# Patient Record
Sex: FEMALE | Race: WHITE
[De-identification: names, ages, dates, MRNs, and addresses within clinical notes are randomized per-mention and may not be internally consistent; named-entity substitution may affect disease eponyms.]

---

## 2021-04-22 ENCOUNTER — HOSPITAL ENCOUNTER (OUTPATIENT)
Dept: HOSPITAL 61 - LAB | Age: 56
End: 2021-04-22
Attending: FAMILY MEDICINE
Payer: SELF-PAY

## 2021-04-22 DIAGNOSIS — E06.3: Primary | ICD-10-CM

## 2021-04-22 PROCEDURE — 36415 COLL VENOUS BLD VENIPUNCTURE: CPT

## 2021-04-22 PROCEDURE — 84443 ASSAY THYROID STIM HORMONE: CPT

## 2021-05-24 ENCOUNTER — HOSPITAL ENCOUNTER (OUTPATIENT)
Dept: HOSPITAL 61 - LAB | Age: 56
End: 2021-05-24
Attending: FAMILY MEDICINE
Payer: COMMERCIAL

## 2021-05-24 DIAGNOSIS — E03.9: Primary | ICD-10-CM

## 2021-05-24 DIAGNOSIS — E11.9: ICD-10-CM

## 2021-05-24 DIAGNOSIS — E55.9: ICD-10-CM

## 2021-05-24 LAB
ALBUMIN SERPL-MCNC: 3.9 G/DL (ref 3.4–5)
ALBUMIN/GLOB SERPL: 1.1 {RATIO} (ref 1–1.7)
ALP SERPL-CCNC: 102 U/L (ref 46–116)
ALT SERPL-CCNC: 34 U/L (ref 14–59)
ANION GAP SERPL CALC-SCNC: 11 MMOL/L (ref 6–14)
AST SERPL-CCNC: 22 U/L (ref 15–37)
BASOPHILS # BLD AUTO: 0.1 X10^3/UL (ref 0–0.2)
BASOPHILS NFR BLD: 1 % (ref 0–3)
BILIRUB SERPL-MCNC: 0.3 MG/DL (ref 0.2–1)
BUN SERPL-MCNC: 14 MG/DL (ref 7–20)
BUN/CREAT SERPL: 20 (ref 6–20)
CALCIUM SERPL-MCNC: 8.6 MG/DL (ref 8.5–10.1)
CHLORIDE SERPL-SCNC: 106 MMOL/L (ref 98–107)
CHOLEST SERPL-MCNC: 191 MG/DL (ref 0–200)
CHOLEST/HDLC SERPL: 3.6 {RATIO}
CO2 SERPL-SCNC: 27 MMOL/L (ref 21–32)
CREAT SERPL-MCNC: 0.7 MG/DL (ref 0.6–1)
EOSINOPHIL NFR BLD: 0.2 X10^3/UL (ref 0–0.7)
EOSINOPHIL NFR BLD: 3 % (ref 0–3)
ERYTHROCYTE [DISTWIDTH] IN BLOOD BY AUTOMATED COUNT: 14.8 % (ref 11.5–14.5)
GFR SERPLBLD BASED ON 1.73 SQ M-ARVRAT: 86.2 ML/MIN
GLUCOSE SERPL-MCNC: 97 MG/DL (ref 70–99)
HBV SURFACE AB SER RIA-ACNC: 172.2 MG/DL
HBV SURFACE AG SERPL QL IA: 9.3 UG/ML
HCT VFR BLD CALC: 42.6 % (ref 36–47)
HDLC SERPL-MCNC: 53 MG/DL (ref 40–60)
HGB BLD-MCNC: 14.2 G/DL (ref 12–15.5)
LDLC: 108 MG/DL (ref 0–100)
LYMPHOCYTES # BLD: 1.9 X10^3/UL (ref 1–4.8)
LYMPHOCYTES NFR BLD AUTO: 31 % (ref 24–48)
MCH RBC QN AUTO: 29 PG (ref 25–35)
MCHC RBC AUTO-ENTMCNC: 33 G/DL (ref 31–37)
MCV RBC AUTO: 86 FL (ref 79–100)
MICRO CREAT RATIO: 5 MG/G CREAT (ref 0–29)
MONO #: 0.4 X10^3/UL (ref 0–1.1)
MONOCYTES NFR BLD: 6 % (ref 0–9)
NEUT #: 3.6 X10^3/UL (ref 1.8–7.7)
NEUTROPHILS NFR BLD AUTO: 59 % (ref 31–73)
PLATELET # BLD AUTO: 265 X10^3/UL (ref 140–400)
POTASSIUM SERPL-SCNC: 4.2 MMOL/L (ref 3.5–5.1)
PROT SERPL-MCNC: 7.6 G/DL (ref 6.4–8.2)
RBC # BLD AUTO: 4.94 X10^6/UL (ref 3.5–5.4)
SODIUM SERPL-SCNC: 144 MMOL/L (ref 136–145)
T4 FREE SERPL-MCNC: 1.36 NG/DL (ref 0.76–1.46)
THYROID STIM HORMONE (TSH): 1.73 UIU/ML (ref 0.36–3.74)
TRIGL SERPL-MCNC: 151 MG/DL (ref 0–150)
VLDLC: 30 MG/DL (ref 0–40)
WBC # BLD AUTO: 6.2 X10^3/UL (ref 4–11)

## 2021-05-24 PROCEDURE — 82306 VITAMIN D 25 HYDROXY: CPT

## 2021-05-24 PROCEDURE — 80053 COMPREHEN METABOLIC PANEL: CPT

## 2021-05-24 PROCEDURE — 84439 ASSAY OF FREE THYROXINE: CPT

## 2021-05-24 PROCEDURE — 83036 HEMOGLOBIN GLYCOSYLATED A1C: CPT

## 2021-05-24 PROCEDURE — 82043 UR ALBUMIN QUANTITATIVE: CPT

## 2021-05-24 PROCEDURE — 85025 COMPLETE CBC W/AUTO DIFF WBC: CPT

## 2021-05-24 PROCEDURE — 82570 ASSAY OF URINE CREATININE: CPT

## 2021-05-24 PROCEDURE — 80061 LIPID PANEL: CPT

## 2021-05-24 PROCEDURE — 36415 COLL VENOUS BLD VENIPUNCTURE: CPT

## 2021-05-24 PROCEDURE — 84443 ASSAY THYROID STIM HORMONE: CPT

## 2021-05-25 LAB — HBA1C MFR BLD: 5.9 % (ref 4.8–5.6)

## 2021-05-27 ENCOUNTER — HOSPITAL ENCOUNTER (OUTPATIENT)
Dept: HOSPITAL 61 - MAMMO | Age: 56
End: 2021-05-27
Attending: FAMILY MEDICINE
Payer: COMMERCIAL

## 2021-05-27 DIAGNOSIS — M51.36: ICD-10-CM

## 2021-05-27 DIAGNOSIS — N64.89: ICD-10-CM

## 2021-05-27 DIAGNOSIS — Z12.31: Primary | ICD-10-CM

## 2021-05-27 PROCEDURE — 77063 BREAST TOMOSYNTHESIS BI: CPT

## 2021-05-27 PROCEDURE — 72148 MRI LUMBAR SPINE W/O DYE: CPT

## 2021-05-27 PROCEDURE — 77067 SCR MAMMO BI INCL CAD: CPT

## 2021-05-27 NOTE — RAD
EXAM: Bilateral digital screening mammogram with tomosynthesis.



HISTORY: 55-year-old female presents for screening mammography.



TECHNIQUE: Full-field digital craniocaudal and mediolateral oblique 2D and 3D tomosynthesis images of
 both breasts are obtained for evaluation. Computer aided detection was applied.



COMPARISON: 3/16/2020



BREAST PARENCHYMAL DENSITY: Level B - Scattered fibroglandular densities.



FINDINGS: There are 2 adjacent sites of possible architectural distortion within the left breast at m
id depth at the 9:00 and 10:00 positions centered approximately 9.5 cm and 7.5 cm from the nipple. Th
philomena are best seen on series 4, images 34 and 44 and series 10, image 51.



There are multiple areas of nodularity and asymmetry within both breasts which are not significantly 
changed when allowing for differences in imaging technique. There are a few benign calcifications.



IMPRESSION: BI-RADS Category 0: Incomplete. Additional imaging needed.



RECOMMENDATION: Further evaluation with a 3D tomosynthesis true lateral view of the left breast and s
pot compression craniocaudal and mediolateral oblique views of the left breast to assess suspected ar
chitectural distortion at the 9:00 and 10:00 positions recommended. Sonographic imaging can also be p
erformed if deemed indicated based on additional mammographic findings.



If your mammogram demonstrates that you have dense breast tissue, which could hide abnormalities, and
 if you have other risk factors for breast cancer that have been identified, you might benefit from s
upplemental screening tests that may be suggested by your ordering physician.  Dense breast tissue, i
n and of itself, is a relatively common condition.  This information is not provided to cause undue c
oncern, but rather to raise your awareness and to promote discussion with your physician regarding th
e presence of other risk factors, in addition to dense breast tissue. A report of your mammography re
sults will be sent to you and your physician.  You should contact your physician if you have any ques
tions or concerns regarding this report.  



Mammography is a sensitive method for finding small breast cancers, but it does not detect them all a
nd is not a substitute for careful clinical examination.  A negative mammogram does not negate a clin
ically suspicious finding and should not result in delay in biopsying a clinically suspicious abnorma
lity.



PQRS compliance statement -  Patient information was entered into a reminder system with a target due
 date for the next mammogram. 



"Our facility is accredited by the [employer] Mammography Program."



Electronically signed by: Jaylyn Rain MD (5/27/2021 10:59 AM) [id number]

## 2021-05-27 NOTE — RAD
MRI of the lumbar spine without contrast 5/27/2021



CLINICAL HISTORY: Low back pain which radiates down the left leg.



TECHNIQUE: Unenhanced T1-weighted and T2-weighted sagittal and axial and inversion recovery sagittal 
images of the lumbar spine were obtained.



FINDINGS: Minimal S-shaped curvature of the thoracolumbar spine is seen. Degenerative signal changes 
are seen involving all of the disks of the lumbar spine. Degenerative signal changes are seen within 
the marrow surrounding these discs. The conus medullaris is normal morphology, position, and signal c
haracteristics.



On the axial images throughout the lumbar disc spaces, the changes of degenerative disc disease are s
een. These consist of minimal to mild generalized disc bulges, degenerative changes involving the fac
et joints and mild to moderate ligamentum flavum hypertrophy. These findings do not result in signifi
cant central spinal canal or neural foraminal stenosis at any level.



IMPRESSION: The changes of degenerative disc disease are seen throughout the lumbar spine. These find
ings do not result in significant central spinal canal or neural foraminal stenosis.



Electronically signed by: Carlos Davies MD (5/27/2021 3:34 PM) [id number]

## 2021-06-01 ENCOUNTER — HOSPITAL ENCOUNTER (OUTPATIENT)
Dept: HOSPITAL 61 - MAMMO | Age: 56
End: 2021-06-01
Attending: FAMILY MEDICINE
Payer: COMMERCIAL

## 2021-06-01 DIAGNOSIS — N63.20: Primary | ICD-10-CM

## 2021-06-01 PROCEDURE — 77065 DX MAMMO INCL CAD UNI: CPT

## 2021-06-01 PROCEDURE — 76641 ULTRASOUND BREAST COMPLETE: CPT

## 2021-06-01 NOTE — RAD
EXAMINATION:  US BREAST LT, MG DIGITAL UNILAT DIAGNOSTIC MAMMO WITH EARL



History: Recalled from screening mammogram for architectural distortion in the left breast.



Comparison:  Screening mammogram 5/27/2021. 



Technique: Spot compression CC and MLO views and full field MLO view of the left breast were obtained
. Ultrasound of the upper outer left breast was performed.





Findings: 

Breast Tissue Density B : There are scattered areas of fibroglandular density. The areas of 
ural distortion at 9:00 and 10:00 9.5 and 7.5 cm posterior to the nipple are not conspicuous on spot 
compression or full-field ML views.



ULTRASOUND: There is a hypoechoic antiparallel mass measuring 3 x 3 x 2 mm 11:00, 5 cm from the nippl
e. This has slightly irregular margins with suggestion of spiculation on cine clip. There is a second
 hypoechoic mass measuring 3 x 3 x 2 mm at 9:00, 6 cm from the nipple. This has slightly angular dariel
ins and is wider than tall. These 2 masses may correspond with the mammographic abnormalities althoug
h are closer to the nipple than seen on mammogram. There is a third more hypoechoic mass at 11:00 in 
the retroareolar region measuring 4 x 3 x 2 mm that was incidentally seen. This is slightly more circ
umscribed in appearance, possibly clustered microcysts. No axillary lymphadenopathy.



IMPRESSION:

3 hypoechoic masses in the left breast, 2 of which may possibly been not definitively correlate with 
the areas of distortion on mammogram. Recommend ultrasound-guided biopsy of the most suspicious mass 
at 11:00, 5 cm from the nipple.





BI-RADS Category 4:  Suspicious.



There is also recommendations were discussed with the patient who is in agreement with the plan.



Mammography is the most sensitive method for finding small breast cancers, but it does not detect the
m all and is not a substitute for careful clinical examination. A negative mammogram does not negate 
a clinically suspicious finding and should not result in delay in biopsying a clinically suspicious a
bnormality.



 "Our facility is accredited by the American [employer] Mammography Program."





The images were reviewed with computer aided detection.



Electronically signed by: Margarita Cosme MD (6/1/2021 5:58 PM) [id number]

## 2021-06-07 ENCOUNTER — HOSPITAL ENCOUNTER (OUTPATIENT)
Dept: HOSPITAL 61 - PNCL | Age: 56
Discharge: HOME | End: 2021-06-07
Attending: ANESTHESIOLOGY
Payer: COMMERCIAL

## 2021-06-07 DIAGNOSIS — M54.5: Primary | ICD-10-CM

## 2021-06-07 DIAGNOSIS — Z98.890: ICD-10-CM

## 2021-06-07 DIAGNOSIS — M51.36: ICD-10-CM

## 2021-06-07 DIAGNOSIS — Z79.899: ICD-10-CM

## 2021-06-07 DIAGNOSIS — M19.90: ICD-10-CM

## 2021-06-07 DIAGNOSIS — M25.552: ICD-10-CM

## 2021-06-07 PROCEDURE — 64493 INJ PARAVERT F JNT L/S 1 LEV: CPT

## 2021-06-07 PROCEDURE — 64494 INJ PARAVERT F JNT L/S 2 LEV: CPT

## 2021-06-07 NOTE — PDOC4
PROCEDURE


Procedure


Patient was consented for left-sided L4-5 and L5-S1 facet joint injections with 

fluoroscopic guidance.  Risks were discussed including but not limited to: 

Bleeding, infection, possibility of epidural hematoma and subsequent 

neurological compromise, dural puncture, headaches, spinal cord and/or nerve 

damage, side effects of steroid medication, and poor results regarding pain 

control.  Patient understands and wished to proceed.





Under sterile prep and drape using C-arm fluoroscopic guidance AP and lateral 

and oblique views, left L4-5 and L5-S1 facet joint injections were performed, m

edications injected: 80 mg Depo-Medrol +2 cc 0.25% bupivacaine +1 cc contrast.  

Condition at discharge stable patient tolerated  the procedure well and no 

complications.











LISANDRO SCOTT MD                Jun 7, 2021 12:02

## 2021-06-07 NOTE — PDOC1
INITIAL PAIN CONSULT


DATE OF SERVICE:


DOS:


DATE: 6/7/21 


TIME: 11:53





CHIEF COMPLAINT:


Chief Complaint:


Low back and left hip pain





HISTORY OF PRESENT ILLNESS:


55-year-old female presents with history of pain low back and into the left hip 

and posterior gluteus occasionally worse for about 3 years.  Patient reports is 

gradually increasing as not the result of any specific injury or accident that 

she is aware of is a constant and sharp at times now she has been treatment at 

outside facility about 2 years ago with radiofrequency ablation on the right 

side only which is about 95% effective and is still doing well the left side is 

her main complaint in the low back itself without radiation to the lower 

extremities but into the left posterior hip at times patient report is worse 

with walking standing changing positions sitting for prolonged periods wakes her

from sleep least 2-3 times a night does not affect her bowel bladder control or 

ability to walk but is uncomfortable.  Patient reports she has not had any 

recent therapies or chiropractic treatments as had these in the past and is 

doing some stretching daily also taking Tylenol and Aleve as well as diclofenac 

cream which does help mildly.  Patient reports the pain is constant sharp at 

times in the low back throbbing aching especially with prolonged sitting and 

standing.  Patient rates her pain is anywhere from a 7-10 with family home 

responsibilities 5-6 with recreation 5-8 with social activity 5-7 with 

occupation sexual behavior self-care and 5-8 with life support activities.  

Patient have MRI scan lumbar spine showing no significant central spinal canal 

stenosis but some changes of degenerative disc disease throughout the lumbar 

spine.  Patient reports no loss of motor function no bowel or bladder 

incontinence.





PAST MEDICAL HISTORY:


PMH:


Arthritis





PREVIOUS SURGERIES:


Past Surgical Hx:


Cervical spinal fusion, total abdominal hysterectomy, right inguinal hernia 

repair





CURRENT MEDICATIONS:


Current Meds:





Active Scripts








 Medications  Dose


 Route/Sig


 Max Daily Dose Days Date Category


 


 Aleve (Naproxen


 Sodium) 220 Mg


 Capsule  220 Mg


 PO BID


   6/7/21 Reported


 


 Acetaminophen 500


 Mg Tablet  1 Tab


 PO PRN Q6HRS PRN


  15 6/7/21 Reported


 


 Buspirone Hcl 5


 Mg Tablet  Unknown Dose


 PO DAILY


   6/7/21 Reported


 


 Oxybutynin


 Chloride 5 Mg


 Tablet  1 Tab


 PO DAILY


   6/7/21 Reported


 


 Levothyroxine


 Sodium 100 Mcg


 Tablet  1 Tab


 PO DAILY


   6/7/21 Reported











FAMILY HISTORY:


Family Hx:


No major medical problems or conditions that she is aware of.





SOCIAL HISTORY:


Social Hx:


Patient is nondrug alcohol does not smoke or use any illegal illicit or 

recreational drugs patient is single lives locally in [facility] and works 

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





REVIEW OF SYSTEMS:


ROS:


Positive for those items mentioned in history of present illness, all systems 

are reviewed, otherwise negative ,and are complete full and well-documented on 

patient's chart.





PHYSICAL EXAM:


VS:


Blood pressure is 133/99 pulse 1 1 respirations 16 temperature 98.3 F height is

5 foot 9 inches weight is 231 pounds


PE:


PHYSICAL EXAMINATION:





GENERAL: The patient is awake, alert, oriented, appropriate, very pleasant in 

demeanor.


HEENT: Shows normocephalic, atraumatic.  Extraocular movements are intact and 

symmetrical.  Oral cavity: Mucous membranes moist and pink.  Dentition intact.


NECK: Shows anterior throat supple without palpable lymphadenopathy noted.  

Swallow reflex symmetrical.


CHEST: Shows normal on inspection.  Breath sounds are clear bilaterally, no 

rales rhonchi wheezes auscultated.


HEART: Shows S1, S2 clear.  No murmurs auscultated.


ABDOMEN: Soft, nontender, nondistended, obese.  No palpable organomegaly is 

noted.  No rebound or guarding demonstrated.


BACK: Shows spine grossly in the midline.  Normal-appearing cervical lordotic 

curvature.  There is increased thoracic kyphosis, some minor flattening of the 

lumbar lordotic curvature.  Lumbar paraspinous muscles show symmetrical on insp

ection, on palpation shows some moderate tenderness diffusely throughout the 

upper, middle and lower distribution of the paraspinous muscles bilaterally and 

also into the lower thoracic paraspinous musculature, firm, but without specific

trigger points, without radiation of pain.  The patient has good rotational 

motion of the lumbar spine, both laterally as well as extension and flexion with

significant tenderness with rotation to the left greater than 10 degrees as well

as with extension of the lumbar spine and axial loading of the low back 

significant pain on the left side with some radiation to the posterior gluteus. 

No tenderness over the spinous processes, sacrum or sacroiliac regions.


EXTREMITIES: Lower extremities show deep tendon reflexes 2+ in the patellar and 

tendo calcaneus tendons.  Motor exam is 5 on a scale of 5 with right 

dorsiflexion, extension, quadriceps and hamstring flexion and 5/5 on the left.  

Peripheral pulses are 1+ posterior tibial.  No peripheral edema is noted 

bilaterally.  Lower extremities are warm and dry to touch, equal in color and 

appearance.  Straight leg raise noted to be negative bilaterally.


SKIN: Shows warm and dry, good turgor.  No edema.  No sores, rashes or bruising 

throughout.





IMPRESSION:


Impression:


55-year-old female with approximate 3-year history of pain left-sided low back, 

consistent with facetogenic pain.


History of radiofrequency ablation on the right with very good results.


MRI scan as noted


Arthritis





Plan: Options discussed with the patient including conservative medical 

management continued physical therapies and interventional techniques.  As 

patient did very well with previous interventional techniques would like to 

pursue these again.  We discussed a lumbar facet joint injection today using 

descriptions as well as anatomical models to describe the procedure.  Patient 

understands and would like to proceed.  As she did very well with radiofrequency

ablation on the right side we will preauthorize for radiofrequency on the left 

side and have diagnostic blocks done today on the left.  Patient will return to 

the clinic in approximately 1 week we will plan on radiofrequency ablation on 

the left side L4-5 and L5-S1 levels at that time.





Under sterile prep and drape using C-arm fluoroscopic guidance AP and lateral 

and oblique views, left L4-5 and L5-S1 facet joint injections were performed, 

medications injected: 80 mg Depo-Medrol +2 cc 0.25% bupivacaine +1 cc contrast. 

Condition at discharge stable patient tolerated  the procedure well and no 

complications.











LISANDRO SCOTT MD                Jun 7, 2021 12:02

## 2021-06-09 ENCOUNTER — HOSPITAL ENCOUNTER (OUTPATIENT)
Dept: HOSPITAL 61 - US | Age: 56
Discharge: HOME | End: 2021-06-09
Attending: FAMILY MEDICINE
Payer: COMMERCIAL

## 2021-06-09 DIAGNOSIS — R92.8: ICD-10-CM

## 2021-06-09 DIAGNOSIS — Z79.899: ICD-10-CM

## 2021-06-09 DIAGNOSIS — N63.24: Primary | ICD-10-CM

## 2021-06-09 PROCEDURE — 88305 TISSUE EXAM BY PATHOLOGIST: CPT

## 2021-06-09 PROCEDURE — 77065 DX MAMMO INCL CAD UNI: CPT

## 2021-06-09 PROCEDURE — 88342 IMHCHEM/IMCYTCHM 1ST ANTB: CPT

## 2021-06-09 PROCEDURE — 88341 IMHCHEM/IMCYTCHM EA ADD ANTB: CPT

## 2021-06-09 PROCEDURE — 19083 BX BREAST 1ST LESION US IMAG: CPT

## 2021-06-09 NOTE — RAD
EXAM: 

1. ULTRASOUND-GUIDED CORE BIOPSY LEFT BREAST WITH CLIP PLACEMENT.

2. POSTCLIP DIAGNOSTIC LEFT MAMMOGRAPHY.



HISTORY: Left breast mass. Ultrasound-guided biopsy is requested.



FINDINGS:  The procedure along with its risks and benefits were explained to the patient. She agreed 
to proceed. A timeout procedure was performed.



The patient's prior mammography and sonography were reviewed. Sonographic images of the left 9:00 pos
ition were also performed. These reveal an irregular hypoechoic solid mass at the 9:00 position 10 cm
 from the nipple measuring 7 x 6 x 6 mm. This is antiparallel and indeterminate. Prior imaging of the
 left axilla reveals no suspicious lymph nodes.



The regions of suggested architectural distortion on prior mammography do not clearly persist and do 
not have clear ultrasound correlates. The lesion of prior sonographic concern appear to represent sma
ll complicated cysts at the 11:00 position 5 cm from the nipple, and subareolar position.



It was decided to biopsy the lesion at the 9:00 position 10 cm from the nipple. The overlying skin wa
s sterilely prepped and infiltrated with 1% lidocaine for local anesthesia. Under ultrasound guidance
, 3 core needle specimens of the target lesion were obtained using a 14-gauge biopsy device. These we
re submitted in formalin. A postbiopsy clip was placed under ultrasound guidance. Pressure was held t
o hemostasis. There were no immediate complications.



Full-field digital mammographic views of the left breast were obtained in CC and MLO projections and 
interpreted on a dedicated workstation. They demonstrate the clip in correspondence with the target l
esion.



IMPRESSION:

1. Successful ultrasound-guided left breast biopsy with clip placement of a mass at the left 9:00 pos
ition 10 cm from the nipple.

2. The postbiopsy clip corresponds with the target lesion.

3. The suggested sonographic lesion at the 11:00 position 5 cm from the nipple most likely reflects a
 benign complicated cyst. The suggested regions of architectural distortion on prior mammographic scr
eening have no clear sonographic correlate. These findings were discussed with the patient. If the co
mpleted biopsy is positive, breast MRI with and without contrast could screen for additional sites of
 disease and further exclude significant lesions at regions of sonographic and mammographic concern. 
If the biopsy is negative, six-month follow-up left diagnostic mammography and sonography is recommen
ded.



Electronically signed by: JANAE Long MD (6/9/2021 2:35 PM) [id number]

## 2021-06-09 NOTE — RAD
EXAM: 

1. ULTRASOUND-GUIDED CORE BIOPSY LEFT BREAST WITH CLIP PLACEMENT.

2. POSTCLIP DIAGNOSTIC LEFT MAMMOGRAPHY.



HISTORY: Left breast mass. Ultrasound-guided biopsy is requested.



FINDINGS:  The procedure along with its risks and benefits were explained to the patient. She agreed 
to proceed. A timeout procedure was performed.



The patient's prior mammography and sonography were reviewed. Sonographic images of the left 9:00 pos
ition were also performed. These reveal an irregular hypoechoic solid mass at the 9:00 position 10 cm
 from the nipple measuring 7 x 6 x 6 mm. This is antiparallel and indeterminate. Prior imaging of the
 left axilla reveals no suspicious lymph nodes.



The regions of suggested architectural distortion on prior mammography do not clearly persist and do 
not have clear ultrasound correlates. The lesion of prior sonographic concern appear to represent sma
ll complicated cysts at the 11:00 position 5 cm from the nipple, and subareolar position.



It was decided to biopsy the lesion at the 9:00 position 10 cm from the nipple. The overlying skin wa
s sterilely prepped and infiltrated with 1% lidocaine for local anesthesia. Under ultrasound guidance
, 3 core needle specimens of the target lesion were obtained using a 14-gauge biopsy device. These we
re submitted in formalin. A postbiopsy clip was placed under ultrasound guidance. Pressure was held t
o hemostasis. There were no immediate complications.



Full-field digital mammographic views of the left breast were obtained in CC and MLO projections and 
interpreted on a dedicated workstation. They demonstrate the clip in correspondence with the target l
esion.



IMPRESSION:

1. Successful ultrasound-guided left breast biopsy with clip placement of a mass at the left 9:00 pos
ition 10 cm from the nipple.

2. The postbiopsy clip corresponds with the target lesion.

3. The suggested sonographic lesion at the 11:00 position 5 cm from the nipple most likely reflects a
 benign complicated cyst. The suggested regions of architectural distortion on prior mammographic scr
eening have no clear sonographic correlate. These findings were discussed with the patient. If the co
mpleted biopsy is positive, breast MRI with and without contrast could screen for additional sites of
 disease and further exclude significant lesions at regions of sonographic and mammographic concern. 
If the biopsy is negative, six-month follow-up left diagnostic mammography and sonography is recommen
ded.



Electronically signed by: JANAE Long MD (6/9/2021 2:35 PM) [id number]

## 2021-06-15 NOTE — PATHOLOGY
[facility] Accession Number: [id number]

.                                                                01

Material submitted:                                        .

breast - LEFT BREAST TISSUE 900 10 CM FN. Modifiers: left, 900, 10 CM

.                                                                01

Clinical history:                                          .

LEFT BREAST BIOPSY

.                                                                02

**********************************************************************

Diagnosis:

Breast tissue, left breast mass 9:00, 10 cm from nipple needle biopsies:

- INVASIVE DUCTAL CARCINOMA, HISTOLOGIC GRADE 2.  SEE COMMENT.

(JPM:/[email or web address]; 06/14/2021)

[facility]  06/15/2021  0816 Local

**********************************************************************

.                                                                02

Comment:

Sections of the left breast mass 9:00 needle biopsy reveal an invasive

mammary carcinoma.  The tumor cells have a small solid nested and focal

cord-like arrangement and show no evidence of tubule formation.  The

tumor is associated with a reactive stroma and focally infiltrates fatty

tissue.  The tumor cells have mild to moderate amounts of pale

eosinophilic cytoplasm, and possess enlarged, mild to focally moderately

pleomorphic nuclei.  A few mitotic figures are noted.  There are no

tumor-associated calcifications. There is no lymphovascular tumor

invasion.  Invasive carcinoma measures up to 6 mm in greatest dimension

on the glass slide.  A limited panel of immunoperoxidase stains is

obtained on A1 and yields the following results:

.

AE1/AE3:  Tumor cells positive

E-cadherin:  Tumor cells positive

.

The morphologic and immunophenotypic findings are supportive of the

diagnosis of an invasive ductal carcinoma, histologic grade 2, with focal

lobular features.  The case is also examined by Dr. Jha, who concurs

with the diagnosis.  Breast prognostic studies will be obtained on A1, the

results of which will be reported separately.

(J[facility]:/[email or web address]; 06/14/2021)

.                                                                02

Electronically signed:                                     .

Mikey Romano MD, Pathologist

NPI- [id number]

.                                                                01

Gross description:                                         .

The specimen is received in formalin, labeled "Jessica Dixon, left breast

9:00 10 cm from nipple" received as 2 soft gray-yellow tissue cores

measuring up to 1.4 cm x 0.2 cm.  The specimen is entirely submitted A1.

The specimen is removed from the patient at 1333 hours and placed in

formalin at 1334 hours on Wednesday, June 9, 2021. The specimen is removed

from formalin at 11:30pm. The specimen is in formalin for greater than 6

hours and less than 72 hours.

([facility]; 6/9/2021)

ELY/ELY  06/10/2021  0755 Local

.                                                                02

Pathologist provided ICD-10:

C50.912

.                                                                02

CPT                                                        .

692023, C41925, T13753

Specimen Comment: A courtesy copy of this report has been sent to [phone], [phone]

Specimen Comment: [id number]

Specimen Comment: Report sent to  / DR POLANCO

***Performed at:  01

   [facility]

   [address], [place]  [id number]

   MD Rob Ventura MD Phone:  [phone]

***Performed at:  02

   [facility]

   [address], [place]  [id number]

   MD Mikey Romano MD Phone:  [phone]

## 2021-06-22 ENCOUNTER — HOSPITAL ENCOUNTER (OUTPATIENT)
Dept: HOSPITAL 61 - PNCL | Age: 56
Discharge: HOME | End: 2021-06-22
Attending: ANESTHESIOLOGY
Payer: COMMERCIAL

## 2021-06-22 DIAGNOSIS — M47.817: ICD-10-CM

## 2021-06-22 DIAGNOSIS — Z79.899: ICD-10-CM

## 2021-06-22 DIAGNOSIS — M51.36: Primary | ICD-10-CM

## 2021-06-22 PROCEDURE — 64636 DESTROY L/S FACET JNT ADDL: CPT

## 2021-06-22 PROCEDURE — 64635 DESTROY LUMB/SAC FACET JNT: CPT

## 2021-06-22 NOTE — PDOC4
PROCEDURE


Procedure


Patient was consented for left L4-5 and L5-S1 medial branch radiofrequency a

blation with fluoroscopic guidance.  Risks were discussed including but not 

limited to: Bleeding, infection, possibility of epidural hematoma and subsequent

neurological compromise, dural puncture, headaches, spinal cord and/or nerve 

damage, potential thermal damage to the surrounding structures including motor 

nerves with permanent ischemic damage, side effects of steroid medication, and 

poor results regarding pain control.  Patient understands and wished to proceed.


Under sterile prep and drape patient in prone position using C-arm fluoroscopic 

guidance patient's lumbar spine was visualized in both AP oblique and lateral 

views using 1% lidocaine to topically anesthetize the areas overlying the left 

L3-4, L4-5 and L5-S1 facet joints at the point of the medial branches.  Using a 

22-gauge insulated radiofrequency needle with curved tips and stylette, the 

needles were advanced to contact the region of the facet with the medial branch 

targets.  This was repeated at the L3-4 L4-5 and L5-S1 levels.  Stylette was r

emoved and using radiofrequency probe inserted into each needle individually at 

each level and then motor tested with no motor stimulation of the lower 

extremity.  Patient did have some multifidus musculature contraction in the 

lumbar spine only but without radiation.  At this time 1 cc of 2% lidocaine was 

then injected in each needle after motor testing but prior to radiofrequency 

ablation.  Needle position was confirmed continuously throughout the 

radiofrequency ablation with both AP oblique and lateral views at each level.  

At this time radiofrequency ablation was carried out each level for 60 seconds 

at 80 C x 2 at each level with the tip of the needle turned 90 degrees after 

the first 60 seconds and then subsequent 60 seconds of radiofrequency ablation. 

Once radiofrequency ablation was completed solution containing 0.25% bupivacaine

1 cc and 20 mg Depo-Medrol was injected at each level.  Needle was then 

withdrawn. Patient had no paresthesias throughout the procedure no radiation of 

pain into the lower extremities no lower extremity motor response with motor 

testing bilaterally.  Please see radiofrequency flowsheet for levels, t

emperatures, impedance, etc.











LISANDRO SCOTT MD               Jun 22, 2021 14:58

## 2021-06-22 NOTE — PDOC
Progress Note - Pain Clinic


Date of Service:


DOS:


DATE: 6/22/21 


TIME: 14:52





Diagnosis:


Dx:


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





History or Present Illness:


HPI:


56-year-old female returns for follow-up status post lumbar facet medial branch 

injections June 7, 2021.  Patient reports that these helped by about 75% 

initially now about 50% improvement for about 1 to 2 weeks patient reports still

significant pain in the low back on the left side only.  Patient had had 

radiofrequency ablation on the right side about 2 years ago and the left side 

now is becoming more significantly tender again better with the facet injections

of 2 weeks ago.  Patient describes the pain still in the low back on the left 

side only sharp radiating across the back at times mostly just in the low back 

not radiating to the lower extremities can be constant with standing walking and

sitting for prolonged periods.  Patient reports her pain is anywhere from 6-10 

at its worst the past week 5 on average to its least and is a 5 today.  Patient 

reports no new motor or sensory deficits no new bladder or bowel incontinence or

other complaints.





Physical Exam:


VS:


Blood pressure is 134/85 pulse 81 respirations 16 temperature 98.3 F weight is 

229 pounds


PE:


PHYSICAL EXAMINATION:





GENERAL: The patient is awake, alert, oriented, appropriate, very pleasant 

demeanor


HEENT: Shows normocephalic, atraumatic.  Extraocular movements are intact and 

symmetrical.  


NECK: Shows anterior throat supple without palpable lymphadenopathy noted.  

Swallow reflex symmetrical.


BACK: Shows spine grossly in the midline.  Normal-appearing cervical lordotic 

curvature.  There is slightly increased thoracic kyphosis, some minor flattening

of the lumbar lordotic curvature.  Lumbar paraspinous muscles show symmetrical 

on inspection, on palpation shows some moderate tenderness diffusely throughout 

the upper, middle and lower distribution of the paraspinous muscles bilaterally 

and also into the lower thoracic paraspinous musculature, firm and tender, but 

without specific trigger points, without radiation of pain.  The patient has 

good rotational motion of the lumbar spine, with moderate tenderness with 

extension right and left lateral rotation greater than 10 degrees and 

significant tenderness with extension lumbar spine only in the left side of the 

low back but this is relieved with forward flexion 45 degrees.  No tenderness 

over the spinous processes, sacrum or sacroiliac regions.


EXTREMITIES: Lower extremities show deep tendon reflexes 2+ in the patellar and 

tendo calcaneus tendons.  Motor exam is 5 on a scale of 5 with right 

dorsiflexion, extension, quadriceps and hamstring flexion and 5/5 on the left.  

Peripheral pulses are 1+ posterior tibial.  No peripheral edema is noted 

bilaterally.  Lower extremities are warm and dry to touch, equal in color and 

appearance.


SKIN: Shows warm and dry, good turgor.  No edema.  No sores, rashes or bruising 

throughout.





Procedure:


Procedure:


Options discussed with patient.  Patient chart was reviewed as her current 

medication regimen updated current review of systems updated today as well.  We 

will proceed with radiofrequency ablation of the left L4-5 and L5-S1 medial 

branch with fluoroscopic guidance.  Risks were discussed including but not 

limited to: Bleeding, infection, possibility of epidural hematoma and subsequent

neurological compromise, dural puncture, headaches, spinal cord and/or nerve 

damage, potential thermal damage to the surrounding tissues as well as motor 

nerves with permanent ischemic damage, side effects of steroid medication, and 

poor results regarding pain control.  Patient understands and wished to proceed.

 Patient will return to the clinic in approximately 3 weeks for follow-up, was 

counseled as to return appointment activity level and side effects to be aware 

of.





Medication Injected:


Med Injected:


Under sterile prep and drape patient in prone position using C-arm fluoroscopic 

guidance patient's lumbar spine was visualized in both AP oblique and lateral 

views using 1% lidocaine to topically anesthetize the areas overlying the L3-4, 

L4-5 and L5-S1 facet joints at the point of the medial branches.  Using a 22-

gauge insulated radiofrequency needle with curved tips and stylette, the needles

were advanced to contact the region of the facet with the medial branch targets.

 This was repeated at the L3-4 L4-5 and L5-S1 levels.  Stylette was removed and 

using radiofrequency probe inserted into each needle individually at each level 

and then motor tested with no motor stimulation of the lower extremity.  Patient

did have some multifidus musculature contraction in the lumbar spine only but 

without radiation.  At this time 1 cc of 2% lidocaine was then injected in each 

needle after motor testing but prior to radiofrequency ablation.  Needle 

position was confirmed continuously throughout the radiofrequency ablation with 

both AP oblique and lateral views at each level.  At this time radiofrequency 

ablation was carried out each level for 60 seconds at 80 C x 2 at each level 

with the tip of the needle turned 90 degrees after the first 60 seconds and then

subsequent 60 seconds of radiofrequency ablation.  Once radiofrequency ablation 

was completed solution containing 0.25% bupivacaine 1 cc and 20 mg Depo-Medrol 

was injected at each level.  Needle was then withdrawn. Patient had no 

paresthesias throughout the procedure no radiation of pain into the lower 

extremities no lower extremity motor response with motor testing bilaterally.  

Please see radiofrequency flowsheet for levels, temperatures, impedance, etc.





Condition at Discharge:


Condition at Discharge:


Condition at discharge stable, patient alert the procedure well and had no 

complications.











LISANDRO SCOTT MD               Jun 22, 2021 14:57

## 2021-06-23 ENCOUNTER — HOSPITAL ENCOUNTER (OUTPATIENT)
Dept: HOSPITAL 35 - MRI | Age: 56
End: 2021-06-23
Payer: COMMERCIAL

## 2021-06-23 DIAGNOSIS — C50.912: ICD-10-CM

## 2021-06-23 DIAGNOSIS — Z53.9: Primary | ICD-10-CM

## 2021-07-12 ENCOUNTER — HOSPITAL ENCOUNTER (OUTPATIENT)
Dept: HOSPITAL 61 - SURG | Age: 56
Discharge: HOME | End: 2021-07-12
Attending: SURGERY
Payer: COMMERCIAL

## 2021-07-12 VITALS — WEIGHT: 232.37 LBS | BODY MASS INDEX: 34.42 KG/M2 | HEIGHT: 69 IN

## 2021-07-12 VITALS
SYSTOLIC BLOOD PRESSURE: 149 MMHG | DIASTOLIC BLOOD PRESSURE: 83 MMHG | SYSTOLIC BLOOD PRESSURE: 149 MMHG | SYSTOLIC BLOOD PRESSURE: 149 MMHG | DIASTOLIC BLOOD PRESSURE: 83 MMHG | DIASTOLIC BLOOD PRESSURE: 83 MMHG

## 2021-07-12 DIAGNOSIS — M19.90: ICD-10-CM

## 2021-07-12 DIAGNOSIS — Z79.899: ICD-10-CM

## 2021-07-12 DIAGNOSIS — F41.9: ICD-10-CM

## 2021-07-12 DIAGNOSIS — G47.30: ICD-10-CM

## 2021-07-12 DIAGNOSIS — R92.8: ICD-10-CM

## 2021-07-12 DIAGNOSIS — E03.9: ICD-10-CM

## 2021-07-12 DIAGNOSIS — Z72.89: ICD-10-CM

## 2021-07-12 DIAGNOSIS — E11.9: ICD-10-CM

## 2021-07-12 DIAGNOSIS — Z88.8: ICD-10-CM

## 2021-07-12 DIAGNOSIS — Z98.890: ICD-10-CM

## 2021-07-12 DIAGNOSIS — C50.912: Primary | ICD-10-CM

## 2021-07-12 DIAGNOSIS — Z90.710: ICD-10-CM

## 2021-07-12 PROCEDURE — A4209 5+ CC STERILE SYRINGE&NEEDLE: HCPCS

## 2021-07-12 PROCEDURE — 88307 TISSUE EXAM BY PATHOLOGIST: CPT

## 2021-07-12 PROCEDURE — A9520 TC99 TILMANOCEPT DIAG 0.5MCI: HCPCS

## 2021-07-12 PROCEDURE — 38500 BIOPSY/REMOVAL LYMPH NODES: CPT

## 2021-07-12 PROCEDURE — A4556 ELECTRODES, PAIR: HCPCS

## 2021-07-12 PROCEDURE — 77065 DX MAMMO INCL CAD UNI: CPT

## 2021-07-12 PROCEDURE — A6255 ABSORPT DRG >16<=48 IN W/BDR: HCPCS

## 2021-07-12 PROCEDURE — 38792 RA TRACER ID OF SENTINL NODE: CPT

## 2021-07-12 PROCEDURE — A4930 STERILE, GLOVES PER PAIR: HCPCS

## 2021-07-12 PROCEDURE — 19285 PERQ DEV BREAST 1ST US IMAG: CPT

## 2021-07-12 PROCEDURE — 88304 TISSUE EXAM BY PATHOLOGIST: CPT

## 2021-07-12 PROCEDURE — 96374 THER/PROPH/DIAG INJ IV PUSH: CPT

## 2021-07-12 PROCEDURE — 88331 PATH CONSLTJ SURG 1 BLK 1SPC: CPT

## 2021-07-12 PROCEDURE — A4213 20+ CC SYRINGE ONLY: HCPCS

## 2021-07-12 PROCEDURE — A6254 ABSORPT DRG <=16 SQ IN W/BDR: HCPCS

## 2021-07-12 PROCEDURE — A6402 STERILE GAUZE <= 16 SQ IN: HCPCS

## 2021-07-12 PROCEDURE — 88342 IMHCHEM/IMCYTCHM 1ST ANTB: CPT

## 2021-07-12 PROCEDURE — 76098 X-RAY EXAM SURGICAL SPECIMEN: CPT

## 2021-07-12 PROCEDURE — 19301 PARTIAL MASTECTOMY: CPT

## 2021-07-12 PROCEDURE — A6258 TRANSPARENT FILM >16<=48 IN: HCPCS

## 2021-07-12 NOTE — RAD
EXAM: Sonographic guided left breast needle-wire localization; left breast post localization mammogra
m; left breast lymphoscintigraphy; left breast specimen radiograph.



HISTORY: 56-year-old female with recent diagnosis of left breast cancer presents for needle-wire loca
lization and lymphoscintigraphy prior to lumpectomy and sentinel node biopsy.



TECHNIQUE: The risks of the procedure discussed with the patient and written and verbal consent was o
btained. A timeout was performed. Sonographic imaging of the left breast was performed and the lesion
 of concern at the 3:00 position containing a biopsy clip is identified. The skin overlying this loca
tion was sterilely prepped, draped and infiltrated with 1 percent lidocaine. A needle-wire system was
 advanced into the lesion and the wire was deployed with sonographic guidance. The wire was secured a
t the skin surface.



Subsequently, the skin of the anterior breast was sterilely prepped and 1.0 mCi technetium Tilmanocep
t was injected intradermally in a periareolar location for lymphoscintigraphy. No scintigraphic image
 was obtained.



The post localization mammogram demonstrates the localization wire hook abutting the biopsy clip and 
through the lesion of concern. The patient was transferred to the operative suite in stable condition
 without immediate complication.



A specimen radiograph demonstrates inclusion of the wire and biopsy clip and lesion of concern.



IMPRESSION:

1. Sonographic guided left breast needle-wire localization for lumpectomy and successful inclusion of
 the biopsy clip and lesion of concern within the surgical specimen.

2. Left breast lymphoscintigraphy.



Electronically signed by: Jaylyn Rain MD (7/12/2021 1:56 PM) [id number]

## 2021-07-12 NOTE — RAD
EXAM: Sonographic guided left breast needle-wire localization; left breast post localization mammogra
m; left breast lymphoscintigraphy; left breast specimen radiograph.



HISTORY: 56-year-old female with recent diagnosis of left breast cancer presents for needle-wire loca
lization and lymphoscintigraphy prior to lumpectomy and sentinel node biopsy.



TECHNIQUE: The risks of the procedure discussed with the patient and written and verbal consent was o
btained. A timeout was performed. Sonographic imaging of the left breast was performed and the lesion
 of concern at the 3:00 position containing a biopsy clip is identified. The skin overlying this loca
tion was sterilely prepped, draped and infiltrated with 1 percent lidocaine. A needle-wire system was
 advanced into the lesion and the wire was deployed with sonographic guidance. The wire was secured a
t the skin surface.



Subsequently, the skin of the anterior breast was sterilely prepped and 1.0 mCi technetium Tilmanocep
t was injected intradermally in a periareolar location for lymphoscintigraphy. No scintigraphic image
 was obtained.



The post localization mammogram demonstrates the localization wire hook abutting the biopsy clip and 
through the lesion of concern. The patient was transferred to the operative suite in stable condition
 without immediate complication.



A specimen radiograph demonstrates inclusion of the wire and biopsy clip and lesion of concern.



IMPRESSION:

1. Sonographic guided left breast needle-wire localization for lumpectomy and successful inclusion of
 the biopsy clip and lesion of concern within the surgical specimen.

2. Left breast lymphoscintigraphy.



Electronically signed by: Jaylyn Rain MD (7/12/2021 2:36 PM) [id number]

## 2021-07-12 NOTE — RAD
EXAM: Sonographic guided left breast needle-wire localization; left breast post localization mammogra
m; left breast lymphoscintigraphy; left breast specimen radiograph.



HISTORY: 56-year-old female with recent diagnosis of left breast cancer presents for needle-wire loca
lization and lymphoscintigraphy prior to lumpectomy and sentinel node biopsy.



TECHNIQUE: The risks of the procedure discussed with the patient and written and verbal consent was o
btained. A timeout was performed. Sonographic imaging of the left breast was performed and the lesion
 of concern at the 3:00 position containing a biopsy clip is identified. The skin overlying this loca
tion was sterilely prepped, draped and infiltrated with 1 percent lidocaine. A needle-wire system was
 advanced into the lesion and the wire was deployed with sonographic guidance. The wire was secured a
t the skin surface.



Subsequently, the skin of the anterior breast was sterilely prepped and 1.0 mCi technetium Tilmanocep
t was injected intradermally in a periareolar location for lymphoscintigraphy. No scintigraphic image
 was obtained.



The post localization mammogram demonstrates the localization wire hook abutting the biopsy clip and 
through the lesion of concern. The patient was transferred to the operative suite in stable condition
 without immediate complication.



A specimen radiograph demonstrates inclusion of the wire and biopsy clip and lesion of concern.



IMPRESSION:

1. Sonographic guided left breast needle-wire localization for lumpectomy and successful inclusion of
 the biopsy clip and lesion of concern within the surgical specimen.

2. Left breast lymphoscintigraphy.



Electronically signed by: Jaylyn Rain MD (7/12/2021 1:56 PM) [id number]

## 2021-07-12 NOTE — RAD
EXAM: Sonographic guided left breast needle-wire localization; left breast post localization mammogra
m; left breast lymphoscintigraphy; left breast specimen radiograph.



HISTORY: 56-year-old female with recent diagnosis of left breast cancer presents for needle-wire loca
lization and lymphoscintigraphy prior to lumpectomy and sentinel node biopsy.



TECHNIQUE: The risks of the procedure discussed with the patient and written and verbal consent was o
btained. A timeout was performed. Sonographic imaging of the left breast was performed and the lesion
 of concern at the 3:00 position containing a biopsy clip is identified. The skin overlying this loca
tion was sterilely prepped, draped and infiltrated with 1 percent lidocaine. A needle-wire system was
 advanced into the lesion and the wire was deployed with sonographic guidance. The wire was secured a
t the skin surface.



Subsequently, the skin of the anterior breast was sterilely prepped and 1.0 mCi technetium Tilmanocep
t was injected intradermally in a periareolar location for lymphoscintigraphy. No scintigraphic image
 was obtained.



The post localization mammogram demonstrates the localization wire hook abutting the biopsy clip and 
through the lesion of concern. The patient was transferred to the operative suite in stable condition
 without immediate complication.



A specimen radiograph demonstrates inclusion of the wire and biopsy clip and lesion of concern.



IMPRESSION:

1. Sonographic guided left breast needle-wire localization for lumpectomy and successful inclusion of
 the biopsy clip and lesion of concern within the surgical specimen.

2. Left breast lymphoscintigraphy.



Electronically signed by: Jaylyn Rain MD (7/12/2021 1:56 PM) [id number]

## 2021-07-12 NOTE — DISCH
DISCHARGE INSTRUCTIONS


Condition on Discharge


Condition on Discharge:  Stable





Activity After Discharge


Activity Instructions for Disc:  Activity as tolerated





Diet after Discharge


Diet after Discharge:  Regular





Wound Incision Care


Wound/Incision Care:  Other, see below (keep dressing clean and dry X 72 hours)





Follow-Up


Follow up with:  Dr Sanchez in 1 week in office, call for appointment 

[phone]











MOHINDER SANCHEZ MD             Jul 12, 2021 14:50

## 2021-08-04 ENCOUNTER — HOSPITAL ENCOUNTER (OUTPATIENT)
Dept: HOSPITAL 61 - RAD | Age: 56
End: 2021-08-04
Attending: RADIOLOGY
Payer: COMMERCIAL

## 2021-08-04 DIAGNOSIS — C50.112: Primary | ICD-10-CM

## 2021-08-04 DIAGNOSIS — M40.294: ICD-10-CM

## 2021-08-04 PROCEDURE — 71046 X-RAY EXAM CHEST 2 VIEWS: CPT

## 2021-08-04 NOTE — RAD
EXAM: Chest, 2 views.



HISTORY: Breast cancer.



COMPARISON: None.



FINDINGS: 2 views of the chest are obtained. There is no infiltrate, pleural effusion or pneumothorax
. There is right middle lobe linear atelectasis or scarring. There is cervical spinal fusion instrume
ntation. The heart is normal in size. There is thoracic kyphosis.



IMPRESSION: No acute pulmonary finding.



Electronically signed by: Jaylyn Rain MD (8/4/2021 1:12 PM) [id number]

## 2021-08-06 ENCOUNTER — HOSPITAL ENCOUNTER (OUTPATIENT)
Dept: HOSPITAL 61 - PNCL | Age: 56
Discharge: HOME | End: 2021-08-06
Attending: ANESTHESIOLOGY
Payer: COMMERCIAL

## 2021-08-06 DIAGNOSIS — M51.36: Primary | ICD-10-CM

## 2021-08-06 DIAGNOSIS — E03.9: ICD-10-CM

## 2021-08-06 DIAGNOSIS — G47.30: ICD-10-CM

## 2021-08-06 DIAGNOSIS — M47.817: ICD-10-CM

## 2021-08-06 DIAGNOSIS — Z90.710: ICD-10-CM

## 2021-08-06 DIAGNOSIS — Z98.890: ICD-10-CM

## 2021-08-06 DIAGNOSIS — F41.9: ICD-10-CM

## 2021-08-06 DIAGNOSIS — Z72.89: ICD-10-CM

## 2021-08-06 DIAGNOSIS — Z88.8: ICD-10-CM

## 2021-08-06 DIAGNOSIS — Z79.899: ICD-10-CM

## 2021-08-06 DIAGNOSIS — M19.90: ICD-10-CM

## 2021-08-06 DIAGNOSIS — E11.9: ICD-10-CM

## 2021-08-06 PROCEDURE — 99212 OFFICE O/P EST SF 10 MIN: CPT

## 2021-08-06 PROCEDURE — G0463 HOSPITAL OUTPT CLINIC VISIT: HCPCS

## 2021-08-06 NOTE — PDOC
Progress Note - Pain Clinic


Date of Service:


DOS:


DATE: 8/6/21 


TIME: 07:56





Diagnosis:


Dx:


Lumbar degenerative disc disease with lumbar and lumbosacral spondylosis





History or Present Illness:


HPI:


56-year-old female returns for follow-up status post radiofrequency ablation 

left L4-5 L5-S1 medial branches returns today with 100% improvement reported in 

the low back on the left side patient reports she is increasing her distance 

walking doing household activities work activities travel with greater ease and 

comfort sleeping better does not awaken from sleep at night patient reports that

she has been traveling walking standing bending stooping doing everything she 

wants without any pain at all.  Patient rates her pain is a 0 at all times 

average worst and least 0 today patient reports that she is very pleased with 

the progress she does have a new diagnosis of breast cancer which is starting 

radiation next month but otherwise doing fairly well.  Patient reports no new 

motor or sensory deficits no bladder incontinence or other concerns.





Physical Exam:


VS:


Blood pressure is 144/85 pulse 66 respirations 18 temperature 98.2 F height 5 

foot 9 inches weight 83 pounds


PE:


PHYSICAL EXAMINATION:





GENERAL: The patient is awake, alert, oriented, appropriate, very pleasant in 

demeanor


HEENT: Shows normocephalic, atraumatic.  Extraocular movements are intact and 

symmetrical.  Oral cavity: Mucous membranes moist and pink.  Dentition is 

intact.


NECK: Shows anterior throat supple without palpable lymphadenopathy noted.  

Swallow reflex symmetrical.


CHEST: Shows normal on inspection.  Breath sounds are clear bilaterally.


HEART: Shows S1, S2 clear.  No murmurs auscultated.


ABDOMEN: Soft, nontender, nondistended.  No palpable organomegaly is noted. 


BACK: Shows spine grossly in the midline.  Normal-appearing cervical lordotic 

curvature.  There is slightly increased thoracic kyphosis, some minor flattening

of the lumbar lordotic curvature.  Lumbar paraspinous muscles show symmetrical 

on inspection, on palpation shows some moderate tenderness diffusely throughout 

the upper, middle and lower distribution of the paraspinous muscles, but without

specific trigger points, without radiation of pain.  The patient has good 

rotational motion of the lumbar spine, both laterally as well as extension and 

flexion without significant difficulty.  No tenderness over the spinous 

processes, sacrum or sacroiliac regions.


EXTREMITIES: Lower extremities show deep tendon reflexes 2+ in the patellar and 

tendo calcaneus tendons.  Motor exam is 5 on a scale of 5 with right 

dorsiflexion, extension, quadriceps and hamstring flexion and 5/5 on the left.  

Peripheral pulses are 1 posterior tibial.  No peripheral edema is noted 

bilaterally.  Lower extremities are warm and dry.


SKIN: Shows warm and dry, good turgor.  No edema.  No sores, rashes or bruising 

throughout.





Procedure:


Procedure:


Options were discussed with the patient.  Patient chart reviews her current 

medication regimen updated current review of systems updated today as well.  We 

will hold any further injections at this time as patient doing quite a bit 

better patient was encouraged to increase activity as tolerated maintain 

stretching and strengthening exercises as well.  At this time patient follow-up 

on as-needed basis.





Medication Injected:


Med Injected:


None





Condition at Discharge:


Condition at Discharge:


Condition at discharge is stable.











LISANDRO SCOTT MD                Aug 6, 2021 07:58

## 2021-09-17 ENCOUNTER — HOSPITAL ENCOUNTER (OUTPATIENT)
Dept: HOSPITAL 63 - DXRAD | Age: 56
End: 2021-09-17
Payer: COMMERCIAL

## 2021-09-17 DIAGNOSIS — M81.8: Primary | ICD-10-CM

## 2021-09-17 DIAGNOSIS — Z79.811: ICD-10-CM

## 2021-09-17 PROCEDURE — 77080 DXA BONE DENSITY AXIAL: CPT

## 2021-09-17 NOTE — RAD
EXAM: DUAL ENERGY X-RAY ABSORPTIOMETRY (DEXA).



HISTORY: Breast cancer therapy. Osteoporosis screening.



FINDINGS: The lowest measured T-score is -1.3 in the right femoral neck, based on a bone mineral dens
ity of 0.788 g/cm^2. Refer to the worksheets for full detail.



No comparison examinations are available.



IMPRESSION:

1. Low bone mass. Bone mineral density yields a T-score between -1.0 and -2.5. Fracture risk is incre
ased.

2. FRAX report: Not calculated.





METHODOLOGY: Dual energy x-ray absorptiometry was performed to measure bone mineral density. The foll
owing analysis is based on the 2019 Official Positions of the International Society for Clinical Dens
itometry: 



Measurements of the hips and the average of L1-L4 are preferred. When the spine and/or hip cannot be 
feasibly measured or interpreted, or in the setting of hyperparathyroidism, distal radial bone minera
l density may be measured. 



The lumbar spine T-score is based on the average bone mineral density of L1-L4. In the setting of art
ifact or anatomic abnormality, some lumbar levels may be excluded, and the remaining levels used for 
calculation. A single lumbar level is not used for diagnosis, and if only a single level is available
 for assessment, another anatomic site will be used to assign a diagnosis.



The hip T-score is based on the bone mineral density measurement of the femoral neck or total proxima
l femur of either side, whichever is lowest. Bilateral mean values are not used for diagnosis.



The forearm T-score is derived from 33% of the distal radius of the nondominant forearm.



Electronically signed by: Carola Esteves MD (9/17/2021 4:39 PM) [id number]

## 2021-09-20 ENCOUNTER — HOSPITAL ENCOUNTER (EMERGENCY)
Dept: HOSPITAL 61 - ER | Age: 56
Discharge: HOME | End: 2021-09-20
Payer: COMMERCIAL

## 2021-09-20 VITALS
DIASTOLIC BLOOD PRESSURE: 74 MMHG | DIASTOLIC BLOOD PRESSURE: 74 MMHG | SYSTOLIC BLOOD PRESSURE: 140 MMHG | DIASTOLIC BLOOD PRESSURE: 74 MMHG | SYSTOLIC BLOOD PRESSURE: 140 MMHG | SYSTOLIC BLOOD PRESSURE: 140 MMHG | DIASTOLIC BLOOD PRESSURE: 74 MMHG | SYSTOLIC BLOOD PRESSURE: 140 MMHG | SYSTOLIC BLOOD PRESSURE: 140 MMHG | SYSTOLIC BLOOD PRESSURE: 140 MMHG | DIASTOLIC BLOOD PRESSURE: 74 MMHG | SYSTOLIC BLOOD PRESSURE: 140 MMHG | SYSTOLIC BLOOD PRESSURE: 140 MMHG | DIASTOLIC BLOOD PRESSURE: 74 MMHG | DIASTOLIC BLOOD PRESSURE: 74 MMHG | DIASTOLIC BLOOD PRESSURE: 74 MMHG | DIASTOLIC BLOOD PRESSURE: 74 MMHG | DIASTOLIC BLOOD PRESSURE: 74 MMHG | SYSTOLIC BLOOD PRESSURE: 140 MMHG | SYSTOLIC BLOOD PRESSURE: 140 MMHG | SYSTOLIC BLOOD PRESSURE: 140 MMHG | DIASTOLIC BLOOD PRESSURE: 74 MMHG | DIASTOLIC BLOOD PRESSURE: 74 MMHG | DIASTOLIC BLOOD PRESSURE: 74 MMHG | SYSTOLIC BLOOD PRESSURE: 140 MMHG | SYSTOLIC BLOOD PRESSURE: 140 MMHG | SYSTOLIC BLOOD PRESSURE: 140 MMHG | SYSTOLIC BLOOD PRESSURE: 140 MMHG | DIASTOLIC BLOOD PRESSURE: 74 MMHG | DIASTOLIC BLOOD PRESSURE: 74 MMHG

## 2021-09-20 VITALS — BODY MASS INDEX: 34.94 KG/M2 | HEIGHT: 69 IN | WEIGHT: 235.89 LBS

## 2021-09-20 DIAGNOSIS — Z88.5: ICD-10-CM

## 2021-09-20 DIAGNOSIS — R11.0: ICD-10-CM

## 2021-09-20 DIAGNOSIS — R07.89: Primary | ICD-10-CM

## 2021-09-20 LAB
ALBUMIN SERPL-MCNC: 3.9 G/DL (ref 3.4–5)
ALBUMIN/GLOB SERPL: 1 {RATIO} (ref 1–1.7)
ALP SERPL-CCNC: 100 U/L (ref 46–116)
ALT SERPL-CCNC: 33 U/L (ref 14–59)
ANION GAP SERPL CALC-SCNC: 8 MMOL/L (ref 6–14)
AST SERPL-CCNC: 22 U/L (ref 15–37)
BASOPHILS # BLD AUTO: 0.1 X10^3/UL (ref 0–0.2)
BASOPHILS NFR BLD: 1 % (ref 0–3)
BILIRUB DIRECT SERPL-MCNC: 0.1 MG/DL (ref 0–0.2)
BILIRUB SERPL-MCNC: 0.2 MG/DL (ref 0.2–1)
BUN SERPL-MCNC: 13 MG/DL (ref 7–20)
BUN/CREAT SERPL: 19 (ref 6–20)
CALCIUM SERPL-MCNC: 9.3 MG/DL (ref 8.5–10.1)
CHLORIDE SERPL-SCNC: 102 MMOL/L (ref 98–107)
CO2 SERPL-SCNC: 29 MMOL/L (ref 21–32)
CREAT SERPL-MCNC: 0.7 MG/DL (ref 0.6–1)
EOSINOPHIL NFR BLD: 0.1 X10^3/UL (ref 0–0.7)
EOSINOPHIL NFR BLD: 2 % (ref 0–3)
ERYTHROCYTE [DISTWIDTH] IN BLOOD BY AUTOMATED COUNT: 14.8 % (ref 11.5–14.5)
GFR SERPLBLD BASED ON 1.73 SQ M-ARVRAT: 85.9 ML/MIN
GLUCOSE SERPL-MCNC: 94 MG/DL (ref 70–99)
HCT VFR BLD CALC: 42.7 % (ref 36–47)
HGB BLD-MCNC: 14.5 G/DL (ref 12–15.5)
LIPASE: 95 U/L (ref 73–393)
LYMPHOCYTES # BLD: 1.1 X10^3/UL (ref 1–4.8)
LYMPHOCYTES NFR BLD AUTO: 21 % (ref 24–48)
MCH RBC QN AUTO: 30 PG (ref 25–35)
MCHC RBC AUTO-ENTMCNC: 34 G/DL (ref 31–37)
MCV RBC AUTO: 87 FL (ref 79–100)
MONO #: 0.4 X10^3/UL (ref 0–1.1)
MONOCYTES NFR BLD: 9 % (ref 0–9)
NEUT #: 3.4 X10^3/UL (ref 1.8–7.7)
NEUTROPHILS NFR BLD AUTO: 67 % (ref 31–73)
PLATELET # BLD AUTO: 234 X10^3/UL (ref 140–400)
POTASSIUM SERPL-SCNC: 3.7 MMOL/L (ref 3.5–5.1)
PROT SERPL-MCNC: 7.7 G/DL (ref 6.4–8.2)
RBC # BLD AUTO: 4.88 X10^6/UL (ref 3.5–5.4)
SODIUM SERPL-SCNC: 139 MMOL/L (ref 136–145)
WBC # BLD AUTO: 5 X10^3/UL (ref 4–11)

## 2021-09-20 PROCEDURE — 36415 COLL VENOUS BLD VENIPUNCTURE: CPT

## 2021-09-20 PROCEDURE — 80076 HEPATIC FUNCTION PANEL: CPT

## 2021-09-20 PROCEDURE — 84484 ASSAY OF TROPONIN QUANT: CPT

## 2021-09-20 PROCEDURE — 96374 THER/PROPH/DIAG INJ IV PUSH: CPT

## 2021-09-20 PROCEDURE — 93005 ELECTROCARDIOGRAM TRACING: CPT

## 2021-09-20 PROCEDURE — 80053 COMPREHEN METABOLIC PANEL: CPT

## 2021-09-20 PROCEDURE — 83880 ASSAY OF NATRIURETIC PEPTIDE: CPT

## 2021-09-20 PROCEDURE — 99285 EMERGENCY DEPT VISIT HI MDM: CPT

## 2021-09-20 PROCEDURE — 96375 TX/PRO/DX INJ NEW DRUG ADDON: CPT

## 2021-09-20 PROCEDURE — 83690 ASSAY OF LIPASE: CPT

## 2021-09-20 PROCEDURE — 71045 X-RAY EXAM CHEST 1 VIEW: CPT

## 2021-09-20 PROCEDURE — 85025 COMPLETE CBC W/AUTO DIFF WBC: CPT

## 2021-09-20 NOTE — RAD
EXAM: Chest, single view.



HISTORY: Chest pain.



COMPARISON: 8/4/2021



FINDINGS: A frontal view of the chest is obtained. There is no infiltrate, pleural effusion or pneumo
thorax. The heart is normal in size. There is partial resolution of cervical spinal fusion instrument
ation.



IMPRESSION: No acute pulmonary finding.



Electronically signed by: Jaylyn Rain MD (9/20/2021 10:29 AM) [facility]

## 2021-09-20 NOTE — PHYS DOC
General Adult


HPI:


HPI:





Patient is a 56-year-old female presents emergency department chief complaint of

sudden onset right-sided chest pain that radiated through to her back and across

her shoulder blades just prior to arrival, complained of nausea, denied vomiting

or diarrhea.  Denies shortness of breath.  Reports pain 7 at onset describing as

a piercing pain, has since resolved to a 3 out of 10 and now describes as a dull

pain since arrival to the ER.  Patient reports she was working as a  

at time of pain onset.  Patient denies increased pain with movement or deep 

inspiration/expiration.  Patient denies a cardiac history, denies smoking 

history, denies drinking alcohol or illicit drug use.  Reports a GERD history 

however has not had a GERD flareup since her Nissen surgery 5 years ago.  

Reports this does not feel like a GERD flareup.  Also reports a "slight headache

"across the forehead since arrival to ER rating a 2 out of 10 pain denies taking

any over-the-counter or prescription medications for pain.  Reports a past 

medical history of left breast cancer, finished her last radiation treatment 

this past Monday.  Denies other physical complaints or concerns.  Patient 

reports completing the COVID-19 virus vaccination series.





Review of Systems:


Review of Systems:


14 body systems of review of systems have been reviewed.  See HPI for pertinent 

positives and negative responses, otherwise all other systems are negative, 

nonpertinent or noncontributory.


Constitutional: Negative except as outlined in HPI above.


Skin: Negative except as outlined in HPI above.


Eyes:   Negative except as outlined in HPI above.


HENT: Negative except as outlined in HPI above.


Respiratory:   Negative except as outlined in HPI above.


Cardiovascular:   Negative except as outlined in HPI above.


GI:   Negative except as outlined in HPI above.


:  Negative except as outlined in HPI above.


Musculoskeletal:   Negative except as outlined in HPI above.


Integument:   Negative except as outlined in HPI above.


Neurologic:   Negative except as outlined in HPI above.


Endocrine:   Negative except as outlined in HPI above.


Lymphatic:  Negative except as outlined in HPI above.


Psychiatric:  Negative except as outlined in HPI above.





Heart Score:


C/O Chest Pain:  Yes


HEART Score for Chest Pain:  








HEART Score for Chest Pain Response (Comments) Value


 


History Slighlty/Non-Suspicious 0


 


ECG Nonspecific Repolarizatio 1


 


Age >45 - < 65 1


 


Risk Factors No Risk Factors 0


 


Troponin < Normal Limit 0


 


Total  2








Risk Factors:


Risk Factors:  DM, Current or recent (<one month) smoker, HTN, HLP, family 

history of CAD, obesity.


Risk Scores:


Score 0 - 3:  2.5% MACE over next 6 weeks - Discharge Home


Score 4 - 6:  20.3% MACE over next 6 weeks - Admit for Clinical Observation


Score 7 - 10:  72.7% MACE over next 6 weeks - Early Invasive Strategies





Current Medications:





Current Medications








 Medications


  (Trade)  Dose


 Ordered  Sig/Diana  Start Time


 Stop Time Status Last Admin


Dose Admin


 


 Aspirin


  (Aspirin


 Chewable)  324 mg  1X  ONCE  21 09:45


 21 09:46 UNV  














Allergies:


Allergies:





Allergies








Coded Allergies Type Severity Reaction Last Updated Verified


 


  hydrocodone Allergy Severe Swelling 21 Yes


 


  prednisone Allergy Severe Swelling 21 Yes











Physical Exam:


PE:





Constitutional: Well developed, well nourished, no acute distress, non-toxic 

appearance.  56-year-old female in no apparent distress.


HENT: Normocephalic, atraumatic.


Eyes: Conjunctiva normal, no discharge.


Neck: Normal range of motion, no stridor.


Cardiovascular: No cyanosis appreciated, distal cap refill less than 2 seconds. 

Heart sounds S1-S2 with auscultation, regular rate and rhythm.


Lungs & Thorax: Patient is in no respiratory distress, no audible adventitious 

lung sounds appreciated.  Normal work of breathing, lung sounds clear to 

auscultate all lung fields.  No pain elicited with palpation of the anterior 

thorax.


Abdomen: Nontender, no abnormalities noted.


Skin: Warm, dry, no erythema, no rash.  


Back: No tenderness, no deformities.


Extremities: No tenderness, no cyanosis, no clubbing, ROM intact, no edema.  


Neurologic: Alert and oriented X 3, normal motor function, normal sensory 

function, no focal deficits noted. 


Psychologic: Affect normal, judgement normal, mood normal. 


.





Current Patient Data:


Labs:





Laboratory Tests








Test


 21


09:44


 


White Blood Count 5.0 x10^3/uL 


 


Red Blood Count 4.88 x10^6/uL 


 


Hemoglobin 14.5 g/dL 


 


Hematocrit 42.7 % 


 


Mean Corpuscular Volume 87 fL 


 


Mean Corpuscular Hemoglobin 30 pg 


 


Mean Corpuscular Hemoglobin


Concent 34 g/dL 





 


Red Cell Distribution Width 14.8 % 


 


Platelet Count 234 x10^3/uL 


 


Neutrophils (%) (Auto) 67 % 


 


Lymphocytes (%) (Auto) 21 % 


 


Monocytes (%) (Auto) 9 % 


 


Eosinophils (%) (Auto) 2 % 


 


Basophils (%) (Auto) 1 % 


 


Neutrophils # (Auto) 3.4 x10^3/uL 


 


Lymphocytes # (Auto) 1.1 x10^3/uL 


 


Monocytes # (Auto) 0.4 x10^3/uL 


 


Eosinophils # (Auto) 0.1 x10^3/uL 


 


Basophils # (Auto) 0.1 x10^3/uL 


 


Sodium Level 139 mmol/L 


 


Potassium Level 3.7 mmol/L 


 


Chloride Level 102 mmol/L 


 


Carbon Dioxide Level 29 mmol/L 


 


Anion Gap 8 


 


Blood Urea Nitrogen 13 mg/dL 


 


Creatinine 0.7 mg/dL 


 


Estimated GFR


(Cockcroft-Gault) 85.9 





 


BUN/Creatinine Ratio 19 


 


Glucose Level 94 mg/dL 


 


Calcium Level 9.3 mg/dL 


 


Total Bilirubin 0.2 mg/dL 


 


Direct Bilirubin 0.1 mg/dL 


 


Aspartate Amino Transf


(AST/SGOT) 22 U/L 





 


Alanine Aminotransferase


(ALT/SGPT) 33 U/L 





 


Alkaline Phosphatase 100 U/L 


 


Troponin I Quantitative < 0.017 ng/mL 


 


NT-Pro-B-Type Natriuretic


Peptide 158 pg/mL 





 


Total Protein 7.7 g/dL 


 


Albumin 3.9 g/dL 


 


Albumin/Globulin Ratio 1.0 


 


Lipase 95 U/L 








Current Medications








 Medications


  (Trade)  Dose


 Ordered  Sig/Diana


 Route


 PRN Reason  Start Time


 Stop Time Status Last Admin


Dose Admin


 


 Aspirin


  (Aspirin


 Chewable)  324 mg  1X  ONCE


 PO


   21 10:15


 21 10:16 DC 21 10:07





 


 Ondansetron HCl


  (Zofran)  4 mg  1X  ONCE


 IVP


   21 10:00


 21 10:01 DC 21 10:06





 


 Ketorolac


 Tromethamine


  (Toradol 30mg


 Vial)  30 mg  1X  ONCE


 IVP


   21 10:00


 21 10:01 DC 21 10:06














EKG:


EKG:


EKG performed at 928 by ED nursing staff shows a normal sinus rhythm heart rate 

81 bpm with left axis deviation, HI interval 0.132, QTc interval 0.433, no acute

STEMI, no ACS, no acute ischemia appreciated, EKG interpreted by ED attending 

physician Dr. Kaba.





Radiology/Procedures:


Radiology/Procedures:


PATIENT: TONY AU  ACCOUNT: [id number]     MRN#: [id number]


: 1965           LOCATION: ER              AGE: 56


SEX: F                    EXAM DT: 21         ACCESSION#: [id number]


STATUS: PRE ER            ORD. PHYSICIAN: YENIFER NOLEN


REASON: Chest pain


PROCEDURE: PORTABLE CHEST 1V





EXAM: Chest, single view.





HISTORY: Chest pain.





COMPARISON: 2021





FINDINGS: A frontal view of the chest is obtained. There is no infiltrate, 

pleural effusion or pneumothorax. The heart is normal in size. There is partial 

resolution of cervical spinal fusion instrumentation.





IMPRESSION: No acute pulmonary finding.





Electronically signed by: Jaylyn Rain MD (2021 10:29 AM) [facility]





Course & Med Decision Making:


Course & Med Decision Making


Pertinent Labs and Imaging studies reviewed. (See chart for details)





56-year-old female, vital signs reviewed, presents emergency department 

concerning sudden onset right-sided chest pain with radiation to back.  Physical

examination concerning for cardiorespiratory process versus nonspecific chest 

wall pain, will start cardiac work-up in the ED today.  4 mg Zofran for nausea, 

324 aspirin, Toradol for headache.





Patient is EKG, cardiac labs, chest x-ray unremarkable, nonconcerning for c

ardiorespiratory process, upon reevaluation of the patient, patient states she 

is pain-free and feels much better now.  Patient was given IV Toradol for pain, 

discussed with patient most likely musculoskeletal chest wall pain, low 

likelihood exacerbation from breast cancer radiation therapy as she has had no 

problems and finished radiation therapy 7 days ago.  Discussed at length with 

patient strict follow-up with primary care this week, return to ER precautions 

or concerns, NSAID therapy for returning chest wall pain.  Continue all home 

medications, patient is amenable to ED discharge planning.





Discussed with the patient all findings and diagnostic testing as well as the 

need to follow-up with their primary care provider for further evaluation and 

treatment or return to the ED if any new or worsening symptoms.  Strict return 

precautions were also discussed at length, the patient voiced understanding and 

agreement with the discharge planning.  The patient was nontoxic in appearance, 

in no apparent distress, and hemodynamically stable at the time of disposition.





Dragon Disclaimer:


Dragon Disclaimer:


This electronic medical record was generated, in whole or in part, using a voice

recognition dictation system.





Departure


Departure


Impression:  


   Primary Impression:  


   Chest wall pain


Disposition:   HOME / SELF CARE / HOMELESS


Condition:  GOOD


Referrals:  


SEGUNDO POLANCO MD (PCP)


Patient Instructions:  Chest Wall Pain





Additional Instructions:  


You were seen in the emergency department today for chest pain and extensive 

cardiorespiratory work-up was performed, your physical presentation and lab 

results with x-ray results and EKG are reassuring that you are not having a 

heart attack or pneumonia or other cardiopulmonary process that would require 

admission to the hospital or immediate intervention by specialist. As we 

discussed, this chest pain is most likely musculoskeletal in nature as the pain 

medication given helped as you stated. Please continue to use NSAIDs for ongoing

aches and pains in the chest area, please follow-up with your primary care 

physician this week for reevaluation and ongoing pain management. Return to the 

emergency department for worsening symptoms or other concerns. Thank you for 

visiting our Emergency Department.  It was a pleasure taking care of you today 

in the emergency department and we appreciate you trusting us with your care. If

any additional problems come up don't hesitate to return to visit us. Please 

follow up with your primary care provider so they can plan additional care if 

needed and know about the problem that you had. If symptoms worsen come back to 

the Emergency Department. Any concerning symptoms that start such as chest pain,

shortness of air, weakness or numbness on one side of the body, running high 

fevers or any other concerning symptoms return to the ER.





EMERGENCY DEPARTMENT GENERAL DISCHARGE INSTRUCTIONS





Thank you for coming to [facility] Emergency Department (ED) 

today and 


trusting us with you care.  We trust that you had a positive experience in our 

Emergency 


Department.  If you wish to speak to the department management, you may call the

Director at 


[phone].





YOUR FOLLOW UP INSTRUCTIONS ARE AS FOLLOWS:





1.  Do you have a private Doctor?  If you do not have a private doctor, please 

ask for a 


resource list of physicians or clinics that may be able to assist you with 

follow up care.





2.  The Emergency Physicain has interpreted your x-rays.  The X-Ray specialist 

will also 


review them.  If there is a change in the findings, you will be notified in 48 

hours when at 


all possible.





3.  A lab test or culture has been done, your results will be reviewed and you 

will be 


notified if you need a change in treatment.





ADDITIONAL INSTRUCTIONS AND INFORMATION:





1.  Your care today has been supervised by a physician who is specially trained 

in emergency 


care.  Many problems require more than one evaluation for a complete diagnosis 

and 


treatment.  We recommend that you schedule your follow up appointment as 

recommended to 


ensure complete treatment of you illness or injury.  If you are unable to obtain

follow up 


care and continue to have a problem, or if your condition worsens, we recommend 

that you 


return to the ED.





2.  We are not able to safely determine your condition over the phone nor are we

able to 


give sound medical advice over the phone.  For these safety reasons, if you call

for medical 


advice we will ask you to come to the ED for further evaluation.





3.  If you have any questions regarding these discharge instructions please call

the ED at 


[phone].





SAFETY INFORMATION:





In the interest of safety, wellness, and injury prevention; we encourage you to 

wear your 


sealbelt, if you smoke; quite smoking, and we encourage family to use a 

protective helmet 


for bicycling and other sporting events that present an increased risk for head 

injury.





IF YOUR SYMPTOMS WORSEN OR NEW SYMPTOMS DEVELOP, OR YOU HAVE CONCERNS ABOUT YOUR

CONDITION; 


OR IF YOUR CONDITION WORSENS WHILE YOU ARE WAITING FOR YOUR FOLLOW UP 

APPOINTMENT; EITHER 


CONTACT YOUR PRIMARY CARE DOCTOR, THE PHYSICIAN WHOSE NAME AND NUMBER YOU WERE 

GIVEN, OR 


RETURN TO THE ED IMMEDIATELY.











YENIFER NOLEN       Sep 20, 2021 10:01

## 2021-09-21 ENCOUNTER — HOSPITAL ENCOUNTER (OUTPATIENT)
Dept: HOSPITAL 61 - PNCL | Age: 56
Discharge: HOME | End: 2021-09-21
Attending: ANESTHESIOLOGY
Payer: COMMERCIAL

## 2021-09-21 DIAGNOSIS — E03.9: ICD-10-CM

## 2021-09-21 DIAGNOSIS — Z88.8: ICD-10-CM

## 2021-09-21 DIAGNOSIS — F41.9: ICD-10-CM

## 2021-09-21 DIAGNOSIS — Z72.89: ICD-10-CM

## 2021-09-21 DIAGNOSIS — Z79.899: ICD-10-CM

## 2021-09-21 DIAGNOSIS — M19.90: ICD-10-CM

## 2021-09-21 DIAGNOSIS — G47.30: ICD-10-CM

## 2021-09-21 DIAGNOSIS — M51.16: Primary | ICD-10-CM

## 2021-09-21 DIAGNOSIS — E11.9: ICD-10-CM

## 2021-09-21 DIAGNOSIS — Z98.890: ICD-10-CM

## 2021-09-21 DIAGNOSIS — Z90.710: ICD-10-CM

## 2021-09-21 DIAGNOSIS — M47.26: ICD-10-CM

## 2021-09-21 PROCEDURE — 62323 NJX INTERLAMINAR LMBR/SAC: CPT

## 2021-09-21 NOTE — PDOC4
Procedure Note:


ICD 10 Code:


ICD 10 Code:


M54.17


M51.87





Procedure Note:


Patient was consented for lumbar epidural steroid injection with fluoroscopic 

guidance.  Risks were discussed including but not limited to: Bleeding, 

infection, possibility of epidural hematoma and subsequent neurological 

compromise, dural puncture, headaches, spinal cord and/or nerve damage, side 

effects of steroid medication, and poor results regarding pain control.  Patient

understands and wished to proceed.


Procedure is lumbar epidural steroid injection under local anesthetic using 

sterile prep and drape at the L5-S1 level using C-arm fluoroscopic guidance in 

both AP and lateral views medications injected is 120 mg Depo-Medrol +10mL 

preservative-free normal saline and 2 mL contrast- condition at discharge is 

stable patient tolerated procedure well had no complications.











LISANDRO SCOTT MD               Sep 21, 2021 08:56

## 2021-09-21 NOTE — PDOC
Progress Note - Pain Clinic


Date of Service:


DOS:


DATE: 9/21/21 


TIME: 08:52





Diagnosis:


Dx:


Lumbar and lumbosacral spondylosis


Lumbar radiculopathy with lumbar degenerative disc disease





History or Present Illness:


HPI:


56-year-old female returns for follow-up last seen August 6, 2021 following 

lumbar radiofrequency ablation on the left side patient reports 100% improvement

with her back pain however she has a new pain now starting up about 4 to 6 weeks

ago which became much more noticeable in the posterior gluteus posterior lateral

thigh posterior calf radiating from the back into the and calf as noted which is

new patient not had the radiating pain to that extent ever before.  Patient 

reports her back still exist doing very well with no significant pain in the 

back itself is radiating pain out into the hip and the lower extremity patient 

reports is an 8-9 on scale 10 at all times over the past week average worst and 

least is an 8-9 today patient reports sharp shooting stabbing radiating shooting

into the left lower extremity which again is a new finding for her.  Patient 

reports no recent injury or accident is difficult with sleeping at night she is 

having difficulty with walking standing from sitting for prolonged periods 

aggravates pain it does awaken her from sleep about every 4 hours.  Patient 

reports no loss of motor function no bowel or bladder incontinence.  Patient has

been taking over-the-counter oral analgesics Tylenol and Motrin which decrease 

the pain but only by about 20 to 30%.  Patient's been doing some stretching 

exercises as well which has not been significantly decreasing the pain.





Physical Exam:


VS:


Blood pressure is 137/80 pulse 68 respirations 18 temperature is 98.7 F height 

is 5 feet 9 inches weight is 236 pounds.


PE:


PHYSICAL EXAMINATION:





GENERAL: The patient is awake, alert, oriented, appropriate, very pleasant in 

demeanor


HEENT: Shows normocephalic, atraumatic.  Extraocular movements are intact and 

symmetrical.  Oral cavity: Mucous membranes moist and pink.  Dentition is 

intact.


NECK: Shows anterior throat supple without palpable lymphadenopathy noted.  

Swallow reflex symmetrical.


CHEST: Shows normal on inspection.  Breath sounds are clear bilaterally, no 

rales or rhonchi.


HEART: Shows S1, S2 clear.  No murmurs auscultated.


ABDOMEN: Soft, nontender, nondistended, obese.  No palpable organomegaly is 

noted.


BACK: Shows spine grossly in the midline.  Normal-appearing cervical lordotic 

curvature.  There is slightly increased thoracic kyphosis, some minor flattening

of the lumbar lordotic curvature.  Lumbar paraspinous muscles show symmetrical 

on inspection, on palpation shows some moderate tenderness diffusely throughout 

the upper, middle and lower distribution of the paraspinous muscles without 

specific trigger points, without radiation of pain.  The patient has good 

rotational motion of the lumbar spine, both laterally as well as extension and 

flexion without significant difficulty.  No tenderness over the spinous 

processes, sacrum or sacroiliac regions.


EXTREMITIES: Lower extremities show deep tendon reflexes 2+ in the patellar and 

tendo calcaneus tendons.  Motor exam is 5 on a scale of 5 with right 

dorsiflexion, extension, quadriceps and hamstring flexion and 5/5 on the left.  

Peripheral pulses are 1+ posterior tibial.  No peripheral edema is noted 

bilaterally.  Lower extremities are warm and dry to touch, equal in color and a

ppearance.


SKIN: Shows warm and dry, good turgor.  No edema.  No sores, rashes or bruising 

throughout.





Procedure:


Procedure:


Options were discussed with the patient.  Patient chart was reviewed as her 

current medication regimen updated current review of systems updated today as 

well.  We will proceed with a lumbar epidural steroid injection today with 

fluoroscopic guidance.  Risks were discussed including but not limited to: 

Bleeding, infection, possibility of epidural hematoma and subsequent 

neurological compromise, dural puncture, headaches, spinal cord and/or nerve 

damage, side effects of steroid medication, and poor results regarding pain 

control.  Patient understands and wished to proceed.  Patient will return to 

clinic in approximate 2 weeks for follow-up, was counseled as return 

appointment, activity level, and side effects to be aware of.





Medication Injected:


Med Injected:


Procedure is lumbar epidural steroid injection under local anesthetic using 

sterile prep and drape at the L5-S1 level using C-arm fluoroscopic guidance in 

both AP and lateral views medications injected is 120 mg Depo-Medrol +10mL 

preservative-free normal saline and 2 mL contrast- condition at discharge is 

stable patient tolerated procedure well had no complications.





Condition at Discharge:


Condition at Discharge:


Condition at discharge is stable, patient tolerated the procedure well and had 

no complications.











LISANDRO SCOTT MD               Sep 21, 2021 08:56

## 2021-10-05 ENCOUNTER — HOSPITAL ENCOUNTER (OUTPATIENT)
Dept: HOSPITAL 61 - PNCL | Age: 56
Discharge: HOME | End: 2021-10-05
Attending: ANESTHESIOLOGY
Payer: COMMERCIAL

## 2021-10-05 DIAGNOSIS — Z88.8: ICD-10-CM

## 2021-10-05 DIAGNOSIS — M51.16: Primary | ICD-10-CM

## 2021-10-05 DIAGNOSIS — Z98.890: ICD-10-CM

## 2021-10-05 DIAGNOSIS — G47.30: ICD-10-CM

## 2021-10-05 DIAGNOSIS — M19.90: ICD-10-CM

## 2021-10-05 DIAGNOSIS — Z79.899: ICD-10-CM

## 2021-10-05 DIAGNOSIS — M47.27: ICD-10-CM

## 2021-10-05 DIAGNOSIS — E11.9: ICD-10-CM

## 2021-10-05 DIAGNOSIS — Z90.710: ICD-10-CM

## 2021-10-05 DIAGNOSIS — E03.9: ICD-10-CM

## 2021-10-05 DIAGNOSIS — Z72.89: ICD-10-CM

## 2021-10-05 PROCEDURE — 62323 NJX INTERLAMINAR LMBR/SAC: CPT

## 2021-10-05 NOTE — PDOC4
Procedure Note:


ICD 10 Code:


ICD 10 Code:


M5 4.17


M51.87


M4 7.816





Procedure Note:


Patient was consented for lumbar epidural steroid traction with fluoroscopic 

guidance.  Risks were discussed including but not limited to: Bleeding, 

infection, possibility of epidural hematoma and subsequent neurological 

compromise, dural puncture, headaches, spinal cord and/or nerve damage, side 

effects of steroid medication, and poor results regarding pain control.  Patient

understands and wished to proceed.


Procedure is lumbar epidural steroid injection under local anesthetic using st

erile prep and drape at the L5-S1 level using C-arm fluoroscopic guidance in 

both AP and lateral views medications injected is 120 mg Depo-Medrol +10mL 

preservative-free normal saline and 2 mL contrast- condition at discharge is 

stable patient tolerated procedure well had no complications.











LISANDRO SCOTT MD                Oct 5, 2021 09:29

## 2021-10-05 NOTE — PDOC
Progress Note - Pain Clinic


Date of Service:


DOS:


DATE: 10/5/21 


TIME: 09:25





Diagnosis:


Dx:


Lumbar radiculopathy lumbar degenerative disease lumbar and lumbosacral 

spondylosis





History or Present Illness:


HPI:


56-year-old female returns for follow-up status post lumbar epidural steroid 

injection x1 most recently September 21, 2020 patient reports he did very well 

first week but 90% improvement with the pain now returning in the low back and 

left lower extremity posterior gluteus posterior thigh posterior calf patient 

reports it is not as far down the leg as it was after the first injection but 

still radiating posterior gluteus and thigh patient reports is worse with 

walking standing changing positions initially she do much better with distance 

walking doing household activities traveling with greater ease and comfort doing

work activities and sleeping better at night patient reports has begun to awaken

her sleep again once better 4 hours or so.  Patient reports no bowel or bladder 

incontinence.  Patient rates her pain as 8-9 on scale 10 at average least at 

worst and is an 8-9 today.  Pain scribes a sharp and shooting tingling burning 

cramping can be radiating constant unbearable at times





Physical Exam:


VS:


Blood pressure is 146/89 pulse 85 respirations 18 temperature 98.4 F weight is 

232 pounds


PE:


PHYSICAL EXAMINATION:





GENERAL: The patient is awake, alert, oriented, appropriate, very pleasant in 

demeanor


HEENT: Shows normocephalic, atraumatic.  Extraocular movements are intact and 

symmetrical.  Oral cavity: Mucous membranes moist and pink.  Dentition is 

intact.


NECK: Shows anterior throat supple without palpable lymphadenopathy noted.  

Swallow reflex symmetrical.


CHEST: Shows normal on inspection.  Breath sounds are clear bilaterally, no 

rales rhonchi or wheezes auscultated.


HEART: Shows S1, S2 clear.  No murmurs auscultated.


ABDOMEN: Soft, nontender, nondistended, obese.  No palpable organomegaly is 

noted. 


BACK: Shows spine grossly in the midline.  Normal-appearing cervical lordotic 

curvature.  There is slightly increased thoracic kyphosis, some minor flattening

of the lumbar lordotic curvature.  Lumbar paraspinous muscles show symmetrical 

on inspection, on palpation shows some moderate tenderness diffusely throughout 

the upper, middle and lower distribution of the paraspinous muscles, without 

specific trigger points, without radiation of pain.  The patient has good 

rotational motion of the lumbar spine, both laterally as well as extension and 

flexion without significant difficulty.  


EXTREMITIES: Lower extremities show deep tendon reflexes 2+ in the patellar and 

tendo calcaneus tendons.  Motor exam is 5 on a scale of 5 with right 

dorsiflexion, extension, quadriceps and hamstring flexion and 5/5 on the left.  

Peripheral pulses are 1+ posterior tibial.  No peripheral edema is noted 

bilaterally.  Lower extremities are warm and dry to touch, equal in color and 

appearance.


SKIN: Shows warm and dry, good turgor.  No edema.  No sores, rashes or bruising 

throughout.





Procedure:


Procedure:


Options discussed with patient.  Patient chart was reviewed as her current 

medication regimen updated current view of systems updated today as well.  We 

will proceed with a lumbar epidural steroid injection today with fluoroscopic 

guidance.  Risks were discussed including but not limited to: Bleeding, 

infection, possibility of epidural hematoma and subsequent neurological 

compromise, dural puncture, headaches, spinal cord and/or nerve damage, side 

effects of steroid medication, and poor results regarding pain control.  Patient

understands and wished to proceed.  Patient return to clinic in approximately 2 

weeks for follow-up, was counseled return appointment, typical, and side effects

to be aware of.





Medication Injected:


Med Injected:


Procedure is lumbar epidural steroid injection under local anesthetic using s

terile prep and drape at the L5-S1 level using C-arm fluoroscopic guidance in 

both AP and lateral views medications injected is 120 mg Depo-Medrol +10mL 

preservative-free normal saline and 2 mL contrast- condition at discharge is 

stable patient tolerated procedure well had no complications.





Condition at Discharge:


Condition at Discharge:


Condition at discharge stable, patient noted that she is well had no 

complications.











LISANDRO SCOTT MD                Oct 5, 2021 09:28

## 2021-10-15 ENCOUNTER — HOSPITAL ENCOUNTER (OUTPATIENT)
Dept: HOSPITAL 61 - LAB | Age: 56
End: 2021-10-15
Attending: INTERNAL MEDICINE
Payer: COMMERCIAL

## 2021-10-15 DIAGNOSIS — Z20.822: ICD-10-CM

## 2021-10-15 DIAGNOSIS — R09.81: ICD-10-CM

## 2021-10-15 DIAGNOSIS — J02.9: Primary | ICD-10-CM

## 2021-10-15 PROCEDURE — U0003 INFECTIOUS AGENT DETECTION BY NUCLEIC ACID (DNA OR RNA); SEVERE ACUTE RESPIRATORY SYNDROME CORONAVIRUS 2 (SARS-COV-2) (CORONAVIRUS DISEASE [COVID-19]), AMPLIFIED PROBE TECHNIQUE, MAKING USE OF HIGH THROUGHPUT TECHNOLOGIES AS DESCRIBED BY CMS-2020-01-R: HCPCS

## 2021-10-19 ENCOUNTER — HOSPITAL ENCOUNTER (OUTPATIENT)
Dept: HOSPITAL 61 - PNCL | Age: 56
Discharge: HOME | End: 2021-10-19
Attending: ANESTHESIOLOGY
Payer: COMMERCIAL

## 2021-10-19 DIAGNOSIS — Z98.890: ICD-10-CM

## 2021-10-19 DIAGNOSIS — E03.9: ICD-10-CM

## 2021-10-19 DIAGNOSIS — G47.30: ICD-10-CM

## 2021-10-19 DIAGNOSIS — Z88.8: ICD-10-CM

## 2021-10-19 DIAGNOSIS — Z72.89: ICD-10-CM

## 2021-10-19 DIAGNOSIS — M47.27: ICD-10-CM

## 2021-10-19 DIAGNOSIS — F41.9: ICD-10-CM

## 2021-10-19 DIAGNOSIS — Z79.899: ICD-10-CM

## 2021-10-19 DIAGNOSIS — Z90.710: ICD-10-CM

## 2021-10-19 DIAGNOSIS — E11.9: ICD-10-CM

## 2021-10-19 DIAGNOSIS — M19.90: ICD-10-CM

## 2021-10-19 DIAGNOSIS — M51.16: Primary | ICD-10-CM

## 2021-10-19 PROCEDURE — 64483 NJX AA&/STRD TFRM EPI L/S 1: CPT

## 2021-10-19 NOTE — PDOC4
Procedure Note:


ICD 10 Code:


ICD 10 Code:


M54.17


M51.87





Procedure Note:


Patient was consented for a left transforaminal L5-S1 epidural steroid injection

with fluoroscopic guidance.  Risks were discussed including but not limited to: 

Bleeding, infection, possibility of epidural hematoma and subsequent 

neurological compromise, dural puncture, headaches, spinal cord and/or nerve 

damage, potential injection of the vertebral artery at that level and permanent 

ischemic damage, side effects of steroid medication, and poor results regarding 

pain control.  Patient understands and wished to proceed.


Under sterile prep and drape patient was placed in prone position using C-arm 

fluoroscopic guidance to identify the L5-S1 distribution oblique and slightly 

cephalad angled C arm.  The left L5-S1 target was identified and using lidocaine

for anesthetizing the skin 22-gauge David pencil point needle was then used 

to enter the skin and into the subcutaneous tissues using direct C-arm 

fluoroscopic guidance to guide the needle into the transforaminal aspect of the 

left L5-S1 vertebrae this was confirmed with lateral views showing the needle 

tip in the superior aspect of the paravertebral region.  Aspiration was noted to

be negative, -1.5 cc of contrast was then injected with good spread both 

medially into the epidural space as well as laterally along the nerve root 

without uptake and without distribution and uptake on digital subtraction.  At 

this time, a solution containing 2 cc of 0.25% bupivacaine and 80 mg of Depo-

Medrol was then injected.  Needle was withdrawn and sterile bandage was applied.

 Patient tolerated procedure well had no immediate complications











LISANDRO SCOTT MD               Oct 19, 2021 08:20

## 2021-10-19 NOTE — PDOC
Progress Note - Pain Clinic


Date of Service:


DOS:


DATE: 10/19/21 


TIME: 08:16





Diagnosis:


Dx:


Lumbar radiculopathy with lumbar degenerative disease and lumbar and lumbosacral

spondylosis





History or Present Illness:


HPI:


56-year-old female returns for follow-up status post lumbar epidural to 

injection x2.  Patient reports about 80% improved initially but generally about 

20% provement after several days the pain returning the left lower extremity as 

well as the low back patient reports an 8-9 on scale of 10 on average least and 

worst is an 8-9 today patient reports that sharp and shooting tingling stabbing 

radiating posterior gluteus on the left side posterior thigh and some in the 

posterior calf and hip as well patient reports no loss of motor function with 

significant increase in pain with walking standing changing positions wakes her 

from sleep at least every 4 hours or so.  Patient reports initially he is doing 

better with distance walking doing household activities work activities but 

after about a week the pain returned fairly significantly on the left side only 

as described.  Patient reports no bowel or bladder incontinence.





Physical Exam:


VS:


Blood pressure is 156/96 pulse 78 respirations are 18 temperature is 98.4 F 

height is 5 feet 9 inches weight is 233 pounds


PE:


PHYSICAL EXAMINATION:





GENERAL: The patient is awake, alert, oriented, appropriate, very pleasant in 

demeanor


HEENT: Shows normocephalic, atraumatic.  Extraocular movements are intact and 

symmetrical.  Oral cavity: Mucous membranes moist and pink.  Dentition is 

intact.


NECK: Shows anterior throat supple without palpable lymphadenopathy noted.  

Swallow reflex symmetrical.


CHEST: Shows normal on inspection.  Breath sounds are clear bilaterally, no 

rales or rhonchi.


HEART: Shows S1, S2 clear.  No murmurs auscultated.


ABDOMEN: Soft, nontender, nondistended, obese.  No palpable organomegaly is 

noted.  


BACK: Shows spine grossly in the midline.  Normal-appearing cervical lordotic 

curvature.  There is slightly increased thoracic kyphosis, some minor flattening

of the lumbar lordotic curvature.  Lumbar paraspinous muscles show symmetrical 

on inspection, on palpation shows some moderate tenderness diffusely throughout 

the upper, middle and lower distribution of the paraspinous muscles without 

specific trigger points, without radiation of pain.  The patient has good 

rotational motion of the lumbar spine, both laterally as well as extension and 

flexion with mild tenderness with extension but not with forward flexion right 

or left lateral rotation.


EXTREMITIES: Lower extremities show deep tendon reflexes 2+ in the patellar and 

tendo calcaneus tendons.  Motor exam is 5 on a scale of 5 with right 

dorsiflexion, extension, quadriceps and hamstring flexion and 5/5 on the left.  

Peripheral pulses are 1+ posterior tibial.  No peripheral edema is noted 

bilaterally.  Lower extremities are warm and dry to touch, equal in color and 

appearance.


SKIN: Shows warm and dry, good turgor.  No edema.  No sores, rashes or bruising 

throughout.





Procedure:


Procedure:


Options discussed with patient.  Patient chart was reviewed as her current 

medication regimen updated current review of systems updated today as well.  We 

will proceed with a left-sided L5-S1 transforaminal injection today with 

fluoroscopic guidance.  Risks were discussed including but not limited to: 

Bleeding, infection, possibility of epidural hematoma and subsequent 

neurological compromise, dural puncture, headaches, spinal cord and/or nerve 

damage, potential injection into the vertebral artery at that level and 

permanent ischemic damage, side effects of steroid medication, and poor results 

regarding pain control.  Patient understands and wished to proceed.  Patient 

will return to the clinic in approximately 2 weeks for follow-up, was counseled 

return appointment activity level and side effects to be aware of.





Medication Injected:


Med Injected:


Under sterile prep and drape patient was placed in prone position using C-arm 

fluoroscopic guidance to identify the L5-S1 distribution oblique and slightly 

cephalad angled C arm.  The left L5-S1 target was identified and using lidocaine

for anesthetizing the skin 22-gauge David pencil point needle was then used 

to enter the skin and into the subcutaneous tissues using direct C-arm 

fluoroscopic guidance to guide the needle into the transforaminal aspect of the 

left L5-S1 vertebrae this was confirmed with lateral views showing the needle 

tip in the superior aspect of the paravertebral region.  Aspiration was noted to

be negative, -1.5 cc of contrast was then injected with good spread both 

medially into the epidural space as well as laterally along the nerve root 

without uptake and without distribution and uptake on digital subtraction.  At 

this time, a solution containing 2 cc of 0.25% bupivacaine and 80 mg of Depo-

Medrol was then injected.  Needle was withdrawn and sterile bandage was applied.

 Patient tolerated procedure well had no immediate complications





Condition at Discharge:


Condition at Discharge:


Condition at discharge stable, patient tolerated procedure well and had no 

complications.











LISANDRO SCOTT MD               Oct 19, 2021 08:19

## 2021-11-02 ENCOUNTER — HOSPITAL ENCOUNTER (OUTPATIENT)
Dept: HOSPITAL 61 - MRI | Age: 56
Discharge: HOME | End: 2021-11-02
Attending: ANESTHESIOLOGY
Payer: COMMERCIAL

## 2021-11-02 DIAGNOSIS — Z98.890: ICD-10-CM

## 2021-11-02 DIAGNOSIS — M47.26: Primary | ICD-10-CM

## 2021-11-02 DIAGNOSIS — M19.90: ICD-10-CM

## 2021-11-02 DIAGNOSIS — Z79.899: ICD-10-CM

## 2021-11-02 DIAGNOSIS — G47.30: ICD-10-CM

## 2021-11-02 DIAGNOSIS — M48.061: ICD-10-CM

## 2021-11-02 DIAGNOSIS — E03.9: ICD-10-CM

## 2021-11-02 DIAGNOSIS — Z88.8: ICD-10-CM

## 2021-11-02 DIAGNOSIS — E11.9: ICD-10-CM

## 2021-11-02 DIAGNOSIS — F41.9: ICD-10-CM

## 2021-11-02 DIAGNOSIS — Z90.710: ICD-10-CM

## 2021-11-02 PROCEDURE — 72148 MRI LUMBAR SPINE W/O DYE: CPT

## 2021-11-02 NOTE — RAD
MR LUMBAR SPINE WO [id number] 



Date: 11/2/2021 3:00 PM 



Indication:  RADICULOPATHY, NUMBNESS DOWN LEFT LOWER EXTREMITY 



Comparison:  None. 



Technique: Multi-planar multi-weighted magnetic resonance imaging of the lumbar spine was performed w
ithout intravenous contrast using the standard lumbar spine protocol.



FINDINGS:



The lumbar spine is normally aligned. No acute fracture. Mild multilevel degenerative disc desiccatio
n and disc height loss. No marrow replacing process to suggest malignancy.



The conus terminates at a normal level. No abnormal signal is seen within the visualized distal spina
l cord. No clumping of intrathecal nerve roots.



No soft tissue abnormality in the visualized abdomen or pelvis.



T12-L1: No disc bulge. No facet arthropathy. No significant spinal stenosis or neural foraminal narro
wing. 



L1-L2: No disc bulge. No facet arthropathy. No significant spinal stenosis or neural foraminal narrow
ing. 



L2-L3: Disc bulge. Mild facet arthropathy. No significant spinal stenosis or neural foraminal narrowi
ng. 



L3-L4: Disc bulge. Mild facet arthropathy. No significant spinal stenosis or neural foraminal narrowi
ng. 



L4-L5: Disc bulge. Mild facet arthropathy. Mild spinal stenosis. Mild left neural foraminal narrowing
. 



L5-S1: Disc bulge. Mild facet arthropathy. No significant spinal stenosis. Mild left neural foraminal
 narrowing. 



IMPRESSION:



Mild lumbar spondylosis.



Electronically signed by: Simone Asher MD (11/2/2021 4:50 PM) [facility]

## 2021-11-10 ENCOUNTER — HOSPITAL ENCOUNTER (OUTPATIENT)
Dept: HOSPITAL 61 - PNCL | Age: 56
Discharge: HOME | End: 2021-11-10
Attending: ANESTHESIOLOGY
Payer: COMMERCIAL

## 2021-11-10 DIAGNOSIS — Z88.8: ICD-10-CM

## 2021-11-10 DIAGNOSIS — G47.30: ICD-10-CM

## 2021-11-10 DIAGNOSIS — E11.9: ICD-10-CM

## 2021-11-10 DIAGNOSIS — M47.816: Primary | ICD-10-CM

## 2021-11-10 DIAGNOSIS — Z79.899: ICD-10-CM

## 2021-11-10 DIAGNOSIS — Z98.890: ICD-10-CM

## 2021-11-10 DIAGNOSIS — F41.9: ICD-10-CM

## 2021-11-10 DIAGNOSIS — E03.9: ICD-10-CM

## 2021-11-10 DIAGNOSIS — M51.36: ICD-10-CM

## 2021-11-10 DIAGNOSIS — Z90.710: ICD-10-CM

## 2021-11-10 DIAGNOSIS — M19.90: ICD-10-CM

## 2021-11-10 PROCEDURE — 64494 INJ PARAVERT F JNT L/S 2 LEV: CPT

## 2021-11-10 PROCEDURE — 64493 INJ PARAVERT F JNT L/S 1 LEV: CPT

## 2021-11-10 NOTE — PDOC4
Procedure Note:


ICD 10 Code:


ICD 10 Code:


M4 7.816


M4 7.817





Procedure Note:


Patient was consented for bilateral lumbar facet medial branch blocks with 

fluoroscopic guidance.  Risks were discussed including but not limited to: 

Bleeding, infection, possibility of epidural hematoma and subsequent 

neurological compromise, dural puncture, headaches, spinal cord and/or nerve 

damage, side effects of steroid medication, and poor results regarding pain 

control.  Patient understands and wished to proceed.


Under sterile prep and drape using C-arm fluoroscopic guidance AP and lateral 

and oblique views, bilateral L4-5 and L5-S1 facet joint MB's injections were 

performed, using quinke needles with stylette's x4,, medications injected: 120 

mg Depo-Medrol +4 cc 0.25% bupivacaine +2 cc contrast.  Condition at discharge 

stable patient tolerated  the procedure well and no complications.











LISANDRO SCOTT MD               Nov 10, 2021 08:28

## 2021-12-23 ENCOUNTER — HOSPITAL ENCOUNTER (OUTPATIENT)
Dept: HOSPITAL 61 - US | Age: 56
End: 2021-12-23
Attending: INTERNAL MEDICINE
Payer: COMMERCIAL

## 2021-12-23 DIAGNOSIS — L76.82: Primary | ICD-10-CM

## 2021-12-23 DIAGNOSIS — C50.112: ICD-10-CM

## 2021-12-23 DIAGNOSIS — R21: ICD-10-CM

## 2021-12-23 PROCEDURE — 76641 ULTRASOUND BREAST COMPLETE: CPT

## 2021-12-23 NOTE — RAD
EXAM: Left breast sonogram.



HISTORY: 56-year-old female with a history of left breast cancer, status post left breast conservatio
n therapy, presents with a left breast rash.



TECHNIQUE: Sonographic imaging of the left breast and axilla was performed.



COMPARISON: 7/12/2021.



FINDINGS: There is a fluid collection within the 2:30 position 12 cm from the nipple measuring approx
imately 6.2 cm in maximum length and 1.5 cm in maximum thickness. This corresponds with the location 
of prior lumpectomy and is consistent with a seroma. There is a smaller seroma within the right axill
a at the site of prior sentinel node biopsy or lymph node dissection. There is no solid lesion. There
 is no architectural distortion or suspicious posterior shadowing.



IMPRESSION:

1. Postoperative seromas within the posterior 2:30 position of the left breast and left axilla due to
 prior lumpectomy and sentinel node biopsy or lymph node dissection. The imaging appearance does not 
favor infected fluid collections/abscesses.

2. No suspicious correlate for a reported left breast skin rash. The patient reports a history of rad
iation therapy 5 months prior to presentation. The possibility of cellulitis is not excluded. Correla
te with physical exam findings and possible dermatologic consultation.

3. BI-RADS Category 3: Probably benign finding(s). Short term follow up with a diagnostic left breast
 mammogram in approximately 1 month can be performed to establish a 6 month post breast conservation 
therapy baseline. Sonographic imaging can be performed at that time to assess stability or aforementi
oned postoperative changes.



Electronically signed by: Jaylyn Rain MD (12/23/2021 10:21 AM) [id number]

## 2022-01-14 ENCOUNTER — HOSPITAL ENCOUNTER (OUTPATIENT)
Dept: HOSPITAL 61 - RAD | Age: 57
Discharge: HOME | End: 2022-01-14
Attending: ANESTHESIOLOGY
Payer: COMMERCIAL

## 2022-01-14 DIAGNOSIS — Z98.890: ICD-10-CM

## 2022-01-14 DIAGNOSIS — M48.061: ICD-10-CM

## 2022-01-14 DIAGNOSIS — Z79.899: ICD-10-CM

## 2022-01-14 DIAGNOSIS — G47.30: ICD-10-CM

## 2022-01-14 DIAGNOSIS — M47.26: Primary | ICD-10-CM

## 2022-01-14 DIAGNOSIS — M79.605: ICD-10-CM

## 2022-01-14 DIAGNOSIS — M54.32: ICD-10-CM

## 2022-01-14 DIAGNOSIS — Z88.8: ICD-10-CM

## 2022-01-14 DIAGNOSIS — E03.9: ICD-10-CM

## 2022-01-14 DIAGNOSIS — M19.90: ICD-10-CM

## 2022-01-14 DIAGNOSIS — M46.1: ICD-10-CM

## 2022-01-14 DIAGNOSIS — E11.9: ICD-10-CM

## 2022-01-14 DIAGNOSIS — Z72.89: ICD-10-CM

## 2022-01-14 PROCEDURE — 62304 MYELOGRAPHY LUMBAR INJECTION: CPT

## 2022-01-14 PROCEDURE — 72132 CT LUMBAR SPINE W/DYE: CPT

## 2022-01-14 NOTE — RAD
EXAM: Fluoroscopic guided lumbar puncture for CT myelography; lumbar spine CT myelogram.



HISTORY: 56-year-old female presents with left-sided sciatica and left lower extremity pain.



TECHNIQUE: The risks of the procedure discussed with the patient and written and verbal consent was o
btained. A timeout was performed. Fluoroscopic imaging of the lumbar spine was performed and a site o
verlying L5-S1 was selected for needle entry. The skin in this location was sterilely prepped, draped
 and infiltrated with 1 percent lidocaine. A 22-gauge spinal needle was advanced into the thecal sac.
 15 cc Omnipaque 180 intrathecal contrast was injected. The needle was removed and a sterile measures
 placed at the needle entry site. Prone and standing neutral, flexion and extension images were obtai
mike. A total of 4 fluoroscopic images were obtained for a total fluoroscopy time of 0.1 minute. The p
atient was transferred to the CT suite for the post injection CT portion of the exam. The patient usha
erated the procedure without complication and was discharged one hour following the procedure in stab
le condition. 



*One or more of the following individualized dose reduction techniques were utilized for this examina
tion:  

1. Automated exposure control.  

2. Adjustment of the mA and/or kV according to patient size.  

3. Use of iterative reconstruction technique.



COMPARISON: MRI dated 11/2/2021.



FINDINGS: There is mild lumbar levoscoliosis. There is no significant listhesis. There is no abnormal
 motion between flexion and extension. The vertebral bodies are normal in height. The disc spaces are
 preserved. There is mild multilevel degenerative endplate remodeling. There is no fracture or suspic
ious osseous lesion. The conus terminates at L1. There is degenerative subchondral sclerosis, spurrin
g and subchondral cyst formation involving the sacroiliac joints. There is a vacuum phenomenon involv
ing the sacroiliac joints. There is no osseous erosion.



At T12-L1, there is a minimal disc bulge and mild endplate remodeling. There is no stenosis.



At L1-L2, there is minimal disc bulge and mild endplate remodeling. There are is no stenosis.



At L2-L3, there is a minimal shallow right paracentral disc protrusion superimposed on a mild disc bu
lge and endplate remodeling. There is no stenosis.



At L3-L4, there is a minimal disc bulge and endplate remodeling. There is no stenosis.



At L4-L5, there is a mild disc bulge and endplate remodeling. There is minimal bilateral facet arthro
santy. There is minimal left extraforaminal stenosis.



At L5-S1, there is a shallow left foraminal to extra foraminal disc protrusion and left posterior lat
eral predominant endplate remodeling superimposed on a mild disc bulge. There is minimal bilateral fa
cet arthropathy. There is mild left foraminal stenosis with abutment the exiting left L5 nerve root.



IMPRESSION:

1. Mild multilevel degenerative change involving the lumbar spine, described in detail above. This is
 associated with minimal left extraforaminal stenosis at L4-L5 and mild left foraminal stenosis with 
abutment the exiting left L5 nerve root at L5-S1.

2. Mild osteoarthritis involving the sacroiliac joints.



Electronically signed by: Jaylyn Rain MD (1/14/2022 9:31 AM) [id number]

## 2022-01-14 NOTE — RAD
EXAM: Fluoroscopic guided lumbar puncture for CT myelography; lumbar spine CT myelogram.



HISTORY: 56-year-old female presents with left-sided sciatica and left lower extremity pain.



TECHNIQUE: The risks of the procedure discussed with the patient and written and verbal consent was o
btained. A timeout was performed. Fluoroscopic imaging of the lumbar spine was performed and a site o
verlying L5-S1 was selected for needle entry. The skin in this location was sterilely prepped, draped
 and infiltrated with 1 percent lidocaine. A 22-gauge spinal needle was advanced into the thecal sac.
 15 cc Omnipaque 180 intrathecal contrast was injected. The needle was removed and a sterile measures
 placed at the needle entry site. Prone and standing neutral, flexion and extension images were obtai
mike. A total of 4 fluoroscopic images were obtained for a total fluoroscopy time of 0.1 minute. The p
atient was transferred to the CT suite for the post injection CT portion of the exam. The patient usha
erated the procedure without complication and was discharged one hour following the procedure in stab
le condition. 



*One or more of the following individualized dose reduction techniques were utilized for this examina
tion:  

1. Automated exposure control.  

2. Adjustment of the mA and/or kV according to patient size.  

3. Use of iterative reconstruction technique.



COMPARISON: MRI dated 11/2/2021.



FINDINGS: There is mild lumbar levoscoliosis. There is no significant listhesis. There is no abnormal
 motion between flexion and extension. The vertebral bodies are normal in height. The disc spaces are
 preserved. There is mild multilevel degenerative endplate remodeling. There is no fracture or suspic
ious osseous lesion. The conus terminates at L1. There is degenerative subchondral sclerosis, spurrin
g and subchondral cyst formation involving the sacroiliac joints. There is a vacuum phenomenon involv
ing the sacroiliac joints. There is no osseous erosion.



At T12-L1, there is a minimal disc bulge and mild endplate remodeling. There is no stenosis.



At L1-L2, there is minimal disc bulge and mild endplate remodeling. There are is no stenosis.



At L2-L3, there is a minimal shallow right paracentral disc protrusion superimposed on a mild disc bu
lge and endplate remodeling. There is no stenosis.



At L3-L4, there is a minimal disc bulge and endplate remodeling. There is no stenosis.



At L4-L5, there is a mild disc bulge and endplate remodeling. There is minimal bilateral facet arthro
santy. There is minimal left extraforaminal stenosis.



At L5-S1, there is a shallow left foraminal to extra foraminal disc protrusion and left posterior lat
eral predominant endplate remodeling superimposed on a mild disc bulge. There is minimal bilateral fa
cet arthropathy. There is mild left foraminal stenosis with abutment the exiting left L5 nerve root.



IMPRESSION:

1. Mild multilevel degenerative change involving the lumbar spine, described in detail above. This is
 associated with minimal left extraforaminal stenosis at L4-L5 and mild left foraminal stenosis with 
abutment the exiting left L5 nerve root at L5-S1.

2. Mild osteoarthritis involving the sacroiliac joints.



Electronically signed by: Jaylyn Rain MD (1/14/2022 9:23 AM) [id number]

## 2022-01-17 ENCOUNTER — HOSPITAL ENCOUNTER (OUTPATIENT)
Dept: HOSPITAL 61 - PNCL | Age: 57
Discharge: HOME | End: 2022-01-17
Attending: ANESTHESIOLOGY
Payer: COMMERCIAL

## 2022-01-17 DIAGNOSIS — E11.9: ICD-10-CM

## 2022-01-17 DIAGNOSIS — Z90.710: ICD-10-CM

## 2022-01-17 DIAGNOSIS — G47.30: ICD-10-CM

## 2022-01-17 DIAGNOSIS — Z72.89: ICD-10-CM

## 2022-01-17 DIAGNOSIS — Z79.899: ICD-10-CM

## 2022-01-17 DIAGNOSIS — M19.90: ICD-10-CM

## 2022-01-17 DIAGNOSIS — Z98.890: ICD-10-CM

## 2022-01-17 DIAGNOSIS — F41.9: ICD-10-CM

## 2022-01-17 DIAGNOSIS — M51.16: ICD-10-CM

## 2022-01-17 DIAGNOSIS — E03.9: ICD-10-CM

## 2022-01-17 DIAGNOSIS — G97.1: Primary | ICD-10-CM

## 2022-01-17 DIAGNOSIS — Z88.8: ICD-10-CM

## 2022-01-17 DIAGNOSIS — M47.26: ICD-10-CM

## 2022-01-17 PROCEDURE — 77003 FLUOROGUIDE FOR SPINE INJECT: CPT

## 2022-01-17 PROCEDURE — 62273 INJECT EPIDURAL PATCH: CPT

## 2022-01-17 NOTE — PDOC4
Procedure Note:


ICD 10 Code:


ICD 10 Code:


G97.1





Procedure Note:


Patient was consented for lumbar epidural blood patch with fluoroscopic guidance

risks discussed including but not limited to bleeding infection possibility of 

intravascular injection sequelae epidural hematoma and subsequent neurological 

compromise worsening of spinal headache with dural puncture as well as potential

damage to nerves and cord and poor results regarding pain control.  Patient 

understands wished to proceed.


Procedure is lumbar epidural blood patch injection under local anesthetic using 

sterile prep and drape at the L4-5 level using C-arm fluoroscopic guidance in 

both AP and lateral views, 20 cc patient is on blood taken under sterile 

technique from left antecubital vein sterilely transferred to epidural space, 

using 2 mL contrast in the epidural space and confirmed epidural spread, without

washout or uptake.- condition at discharge is stable patient tolerated procedure

well had no complications.











LISANDRO SCOTT MD               Jan 17, 2022 16:03

## 2022-01-17 NOTE — PDOC
Progress Note - Pain Clinic


Date of Service:


DOS:


DATE: 1/17/22 


TIME: 15:55





Diagnosis:


Dx:


Post dural puncture headache, status post lumbar myelogram 1/14/2022


Lumbar radiculopathy with lumbar degenerative disease lumbar spondylosis





History or Present Illness:


HPI:


56-year-old female returns for follow-up status post lumbar epidural steroid 

injection as well as facet blocks also recent MRI scan which we discussed with 

her on the phone earlier after myelogram which patient reports she had a 

headache now for about 2 days Biogam was 3 days ago getting worse positional 

worse with standing and sitting much better with sitting or laying down but not 

completely gone patient reports is behind her eyes and split the back of the 

head as of the top of the head again much worse with positional changes with 

being upright sitting and standing.  Patient was at work earlier today and 

called as the headache was becoming unbearable.  We had the patient come to the 

clinic and lay down for about 2 hours to let the headache subside and was much 

better but not completely resolved.  Patient reports has been pushing fluids at 

home also caffeine as well as oral analgesics which have not decrease the 

headache significantly.  Patient scribes pain is sharp and stabbing in the whole

head specially behind the eyes and the top of the head again positional in 

nature consistent with postdural puncture headache.  Patient reports still 

significant pain in the low back and the left lower extremity as she has had 

previously and we discussed the findings of a L5-S1 shallow left foraminal to 

extraforaminal disc protrusion at the L5-S1 level with abutment of the exiting 

L5 nerve root.





Physical Exam:


VS:


Blood pressure is 136/82 pulse 66 respirations 18 temperature is 98.2 F height 

and weight were deferred per patient's request.


PE:


PHYSICAL EXAMINATION:





GENERAL: The patient is awake, alert, oriented, appropriate, very pleasant and 

demeanor


HEENT: Shows normocephalic, atraumatic.  Extraocular movements are intact and 

symmetrical.  Pupils equal round reactive to light and accommodation.  Patient 

with some mild photophobia with brighter lights in the exam room.  Oral cavity: 

Mucous membranes moist and pink.  Dentition is intact.


NECK: Shows anterior throat supple without palpable lymphadenopathy noted.  

Swallow reflex symmetrical.


CHEST: Shows normal on inspection.  Breath sounds are clear bilaterally, distant

but no rales rhonchi wheezes auscultated.


HEART: Shows S1, S2 clear.  No murmurs auscultated.


ABDOMEN: Soft, nontender, nondistended.  No palpable organomegaly is noted.


BACK: Shows spine grossly in the midline.  Normal-appearing cervical lordotic 

curvature.  There is mildly increased thoracic kyphosis, some flattening of the 

lumbar lordotic curvature.  Mild bruising at site of myelogram needle insertion.

 Lumbar paraspinous muscles show symmetrical on inspection, on palpation shows 

some moderate tenderness diffusely throughout the upper, middle and lower 

distribution of the paraspinous muscles, but without specific trigger points, 

without radiation of pain.  The patient has good rotational motion of the lumbar

spine, both laterally as well as extension and flexion without significant 

difficulty.  No tenderness over the spinous processes, sacrum or sacroiliac re

gions.


EXTREMITIES: Lower extremities show deep tendon reflexes 2+ in the patellar and 

tendo calcaneus tendons.  Motor exam is 5 on a scale of 5 with right 

dorsiflexion, extension, quadriceps and hamstring flexion and 5/5 on the left.  

Peripheral pulses are 1+ posterior tibial.  No peripheral edema is noted 

bilaterally.  Lower extremities are warm and dry.


SKIN: Shows warm and dry, good turgor.  No edema.  No sores, rashes or bruising 

throughout.





Procedure:


Procedure:


Options discussed with the patient.  Patient's old chart was reviewed as her 

current medication regimen updated current review of systems updated today as 

well.  We will proceed with a epidural lumbar blood patch with fluoroscopic 

guidance.  Risk were discussed including but not limited to bleeding infection 

possibility of epidural hematoma and subsequent neurological compromise dural pu

ncture and worsening of headache spinal nerve and/or cord damage as well as poor

results regarding pain control and exposure fluoroscopy.  Patient understands 

and wished to proceed.  Patient return to clinic in approximately 1 week for 

follow-up.





Medication Injected:


Med Injected:


Procedure is lumbar epidural blood patch injection under local anesthetic using 

sterile prep and drape at the L4-5 level using C-arm fluoroscopic guidance in 

both AP and lateral views, 20 cc patient is on blood taken under sterile 

technique from left antecubital vein sterilely transferred to epidural space, 

using 2 mL contrast in the epidural space and confirmed epidural spread, without

washout or uptake.- condition at discharge is stable patient tolerated procedure

well had no complications.





Condition at Discharge:


Condition at Discharge:


Condition at discharge stable, patient tolerated procedure well and had no 

complications.











LISANDRO SCOTT MD               Jan 17, 2022 16:02

## 2022-02-14 ENCOUNTER — HOSPITAL ENCOUNTER (OUTPATIENT)
Dept: HOSPITAL 61 - PNCL | Age: 57
Discharge: HOME | End: 2022-02-14
Attending: ANESTHESIOLOGY
Payer: COMMERCIAL

## 2022-02-14 DIAGNOSIS — M51.16: Primary | ICD-10-CM

## 2022-02-14 DIAGNOSIS — F41.9: ICD-10-CM

## 2022-02-14 DIAGNOSIS — Z98.890: ICD-10-CM

## 2022-02-14 DIAGNOSIS — Z79.899: ICD-10-CM

## 2022-02-14 DIAGNOSIS — E11.9: ICD-10-CM

## 2022-02-14 DIAGNOSIS — G47.30: ICD-10-CM

## 2022-02-14 DIAGNOSIS — M19.90: ICD-10-CM

## 2022-02-14 DIAGNOSIS — Z88.8: ICD-10-CM

## 2022-02-14 DIAGNOSIS — E03.9: ICD-10-CM

## 2022-02-14 DIAGNOSIS — Z72.89: ICD-10-CM

## 2022-02-14 DIAGNOSIS — M47.27: ICD-10-CM

## 2022-02-14 PROCEDURE — 99212 OFFICE O/P EST SF 10 MIN: CPT

## 2022-02-14 PROCEDURE — G0463 HOSPITAL OUTPT CLINIC VISIT: HCPCS

## 2022-02-14 NOTE — PDOC
Progress Note - Pain Clinic


Date of Service:


DOS:


DATE: 2/14/22 


TIME: 08:46





Diagnosis:


Dx:


Lumbar radiculopathy with lumbar degenerative disc disease and lumbar and 

lumbosacral spondylosis





History or Present Illness:


HPI:


56-year-old female returns for follow-up status post bilateral lumbar facet 

injection as well as lumbar epidural steroid injections with significant 

immediate improvement but no long-lasting improvement patient reports still 

significant pain low back specially in the left lower extremity posterior gluteu

s posterior thigh posterior calf to the ankle and foot with some weakness 

developing in the left leg as well patient is recently seen a neurosurgeon with 

recommendations for nonsurgical treatment.  We discussed a spinal cord 

stimulator in the past and patient was given some information regarding it and 

would like to discussed this in greater detail.  Patient reports pain still in t

he low back left lower extremity as it was previously sharp and shooting in 

quality with tingling stabbing pain in the low back and left leg traveling 

radiating constant and severe worse with walking standing change positions 

disturbing sleep multiple times throughout the night patient reports is 

affecting her mood significantly she feels much more irritable and depressed 

with the pain.  Patient is very frustrated that the pain continues to return and

there is no surgical alternative at this time.  Patient reports no bowel or 

bladder incontinence but significant weakness with left lower extremity activity

and relates an incident last week where she caught herself with left leg on the 

stairs and almost fell further.





Physical Exam:


VS:


Blood pressure is 140/89 pulse 81 respirations 16 temperature 98.2 F height is 

5 foot 9 inches weight is 234 pounds.


PE:


PHYSICAL EXAMINATION:





GENERAL: The patient is awake, alert, oriented, appropriate, very pleasant in d

emeanor


HEENT: Shows normocephalic, atraumatic.  Extraocular movements are intact and 

symmetrical.  Oral cavity: Mucous membranes moist and pink.  Dentition is 

intact.


NECK: Shows anterior throat supple without palpable lymphadenopathy noted.  

Swallow reflex symmetrical.


CHEST: Shows normal on inspection.  Breath sounds are clear bilaterally.


HEART: Shows S1, S2 clear.  No murmurs auscultated.


ABDOMEN: Soft, nontender, nondistended.  No palpable organomegaly is noted.


BACK: Shows spine grossly in the midline.  Normal-appearing cervical lordotic 

curvature.  There is moderately increased thoracic kyphosis, some flattening of 

the lumbar lordotic curvature.  Lumbar paraspinous muscles show symmetrical on 

inspection, on palpation shows some moderate tenderness diffusely throughout the

upper, middle and lower distribution of the paraspinous muscles, but without 

specific trigger points, without radiation of pain.  The patient has good 

rotational motion of the lumbar spine, both laterally as well as extension and 

flexion with moderate discomfort mostly with extension but without radiation.  

No tenderness over the spinous processes, sacrum or sacroiliac regions.


EXTREMITIES: Lower extremities show deep tendon reflexes 2+ in the patellar and 

tendo calcaneus tendons.  Motor exam is 5 on a scale of 5 with right 

dorsiflexion, extension, quadriceps and hamstring flexion and 4/5 on the left.  

Peripheral pulses are 1+ posterior tibial.  No peripheral edema is noted 

bilaterally.  Lower extremities are warm and dry to touch, equal in color and 

appearance.  


SKIN: Shows warm and dry, good turgor.  No edema.  No sores, rashes or bruising 

throughout.





Procedure:


Procedure:


Options discussed with patient.  Patient's old chart was reviewed as her current

medication regimen updated current review of systems updated today as well.  We 

discussed spinal cord stimulation and patient was given information regarding 

the product.  Patient would like to move forward we will have psychiatric 

evaluation performed first and require preauthorization with patient's insurance

provider for spinal cord stimulator temporary leads placement for trial.





Medication Injected:


Med Injected:


None





Condition at Discharge:


Condition at Discharge:


Condition at discharge is stable.











LISANDRO SCOTT MD               Feb 14, 2022 08:50

## 2022-03-10 ENCOUNTER — HOSPITAL ENCOUNTER (OUTPATIENT)
Dept: HOSPITAL 61 - US | Age: 57
End: 2022-03-10
Attending: INTERNAL MEDICINE
Payer: COMMERCIAL

## 2022-03-10 DIAGNOSIS — R23.4: ICD-10-CM

## 2022-03-10 DIAGNOSIS — C50.112: Primary | ICD-10-CM

## 2022-03-10 PROCEDURE — 76641 ULTRASOUND BREAST COMPLETE: CPT

## 2022-03-10 PROCEDURE — 77066 DX MAMMO INCL CAD BI: CPT

## 2022-03-10 NOTE — RAD
BILATERAL DIAGNOSTIC 3-D MAMMOGRAPHY AND LEFT BREAST ULTRASOUND



History: Personal history of left breast cancer post left breast conservation therapy. Ultrasound fol
low-up of left breast seroma.



Comparison:  Bilateral mammogram May 27, 2021. Left breast ultrasound December 23, 2021.



Technique: Routine MLO and CC tomosynthesis (3D) digital views performed. Images reviewed by the radi
ologist at dedicated workstation. 



Findings:  

Breast Tissue Density B : There are scattered areas of fibroglandular density.

Glandular nodularity of the right breast is stable. Biopsy clip upper outer right breast, stable. The
re are post therapy changes of the upper outer posterior left breast. There is a well-circumscribed a
nd obscured mass at the lumpectomy site suggestive of a seroma. There is slight skin thickening.

There are no dominant masses, suspicious microcalcifications or unexplained architectural distortion.
 



Real-time ultrasound imaging of the left breast is performed.

Fluid collection at the 2:30 position 12 cm from the nipple measures up to 6.5 x 2.3 cm, previously 6
.2 x 1.5 cm. The fluid collection is well-circumscribed and has ovoid shape. Color Doppler interrogat
ion is negative. The other much smaller fluid collection in the axilla at the area of scar has resolv
ed.



IMPRESSION:

1.  There are post therapy changes of the upper outer posterior left breast.

2.  Postoperative seroma at the 2:30 position of the left breast is mildly larger. The second much sm
aller seroma in the axilla has resolved.

3.  Recommend diagnostic left mammogram and breast ultrasound follow-up in 6 months.

4.  Right mammogram is stable.



BI-RADS category 3:  Probably benign.



The images were reviewed with computer-aided detection.



Patient information is entered into the reminder system with a target due date for the next screening
 mammogram.



Mammography is the most sensitive method for finding small breast cancers, but it does not detect the
m all and is not a substitute for careful clinical examination. A negative mammogram does not negate 
a clinically suspicious finding and should not result in delay in biopsying a clinically suspicious a
bnormality.



 "Our facility is accredited by the [employer] Mammography Program."



Electronically signed by: Chaitanya Acevedo MD (3/10/2022 2:39 PM) [facility]

## 2022-03-10 NOTE — RAD
BILATERAL DIAGNOSTIC 3-D MAMMOGRAPHY AND LEFT BREAST ULTRASOUND



History: Personal history of left breast cancer post left breast conservation therapy. Ultrasound fol
low-up of left breast seroma.



Comparison:  Bilateral mammogram May 27, 2021. Left breast ultrasound December 23, 2021.



Technique: Routine MLO and CC tomosynthesis (3D) digital views performed. Images reviewed by the radi
ologist at dedicated workstation. 



Findings:  

Breast Tissue Density B : There are scattered areas of fibroglandular density.

Glandular nodularity of the right breast is stable. Biopsy clip upper outer right breast, stable. The
re are post therapy changes of the upper outer posterior left breast. There is a well-circumscribed a
nd obscured mass at the lumpectomy site suggestive of a seroma. There is slight skin thickening.

There are no dominant masses, suspicious microcalcifications or unexplained architectural distortion.
 



Real-time ultrasound imaging of the left breast is performed.

Fluid collection at the 2:30 position 12 cm from the nipple measures up to 6.5 x 2.3 cm, previously 6
.2 x 1.5 cm. The fluid collection is well-circumscribed and has ovoid shape. Color Doppler interrogat
ion is negative. The other much smaller fluid collection in the axilla at the area of scar has resolv
ed.



IMPRESSION:

1.  There are post therapy changes of the upper outer posterior left breast.

2.  Postoperative seroma at the 2:30 position of the left breast is mildly larger. The second much sm
aller seroma in the axilla has resolved.

3.  Recommend diagnostic left mammogram and breast ultrasound follow-up in 6 months.

4.  Right mammogram is stable.



BI-RADS category 3:  Probably benign.



The images were reviewed with computer-aided detection.



Patient information is entered into the reminder system with a target due date for the next screening
 mammogram.



Mammography is the most sensitive method for finding small breast cancers, but it does not detect the
m all and is not a substitute for careful clinical examination. A negative mammogram does not negate 
a clinically suspicious finding and should not result in delay in biopsying a clinically suspicious a
bnormality.



 "Our facility is accredited by the [employer] Mammography Program."



Electronically signed by: Chaitanya Acevedo MD (3/10/2022 2:39 PM) [facility]

## 2022-03-14 ENCOUNTER — HOSPITAL ENCOUNTER (OUTPATIENT)
Dept: HOSPITAL 61 - PNCL | Age: 57
Discharge: HOME | End: 2022-03-14
Attending: ANESTHESIOLOGY
Payer: COMMERCIAL

## 2022-03-14 DIAGNOSIS — E03.9: ICD-10-CM

## 2022-03-14 DIAGNOSIS — G47.30: ICD-10-CM

## 2022-03-14 DIAGNOSIS — M51.16: Primary | ICD-10-CM

## 2022-03-14 DIAGNOSIS — Z88.8: ICD-10-CM

## 2022-03-14 DIAGNOSIS — Z72.89: ICD-10-CM

## 2022-03-14 DIAGNOSIS — M47.26: ICD-10-CM

## 2022-03-14 DIAGNOSIS — F41.9: ICD-10-CM

## 2022-03-14 DIAGNOSIS — Z90.710: ICD-10-CM

## 2022-03-14 DIAGNOSIS — Z98.890: ICD-10-CM

## 2022-03-14 DIAGNOSIS — M19.90: ICD-10-CM

## 2022-03-14 DIAGNOSIS — Z79.899: ICD-10-CM

## 2022-03-14 PROCEDURE — 63650 IMPLANT NEUROELECTRODES: CPT

## 2022-03-14 NOTE — PDOC4
Procedure Note:


ICD 10 Code:


ICD 10 Code:


M54.16


M51.36





Procedure Note:


Patient was consented for spinal cord stimulator temporary leads placement x2 

with fluoroscopic guidance.  Risks were discussed including but not limited to: 

Bleeding, infection, possibility of epidural hematoma and subsequent 

neurological compromise, dural puncture, headaches, spinal cord and/or nerve 

damage, and poor results regarding pain control.  Patient understands and wished

to proceed.


Under sterile prep and drape patient in prone position using C-arm fluoroscopic 

guidance patient's lumbar spine was identified and vertebral levels were counted

did put external marker on the T8 level.  This time the lumbar spine was 

revisualized and using 1% lidocaine, the area over the L4-5 level was 

anesthetized and then using a 14-gauge [employer] needle with stylette was entered 

to the epidural space at the L2-3 level using a paramedian approach to the right

with preservative-free normal saline loss-of-resistance technique aspiration was

noted to be negative and using direct fluoroscopy visualization spinal cord 

stimulator lead was then advanced without significant resistance in the midline 

and confirmed posterior with both AP and lateral views, and advanced to the 

superior endplate of the T8 vertebral level superimposed with the superior 

spinal cord stimulator electrode lead.  Fluoroscopy was used in a lateral view 

to verify posterior placement in the epidural space at this point as well.  At 

this time a second lead was then introduced in similar fashion at the L4-5 level

and inserted and in the epidural space at the L2-3 level once again with 

preservative-free normal saline loss-of-resistance technique.  Aspiration was 

again noted to be negative and using direct visualization with fluoroscopy 

second lumbar spinal cord stimulator lead was advanced without significant 

resistance in the midline with the superior electrode superimposed over the 

superior endplate of the T9 vertebral body.  Lateral visualization was again 

confirmed with placement of the stimulator in the posterior epidural space.  At 

this time the needles and stylette were removed with intermittent fluoroscopic 

visualization maintaining that the leads had not moved during this process and 

this was confirmed.  This time 1% lidocaine was used to anesthetize the skin 

next to the insertion sites of the stimulator wires and using a 2-0 silk were 

then sutured in place.  Mastisol and Tegaderm was then applied as well as 

reinforcing tape and gauze.  Patient was transferred to the recovery area under 

his own power walking without difficulty and had no immediate complications from

the procedure.  Stimulation was then carried out with [employer] representatives.  

Patient will return to the clinic in approximately 1 week for removal of the 

temporary leads and reassessment of the patient's pain level.











LISANDRO SCOTT MD               Mar 14, 2022 16:43

## 2022-03-14 NOTE — PDOC
Progress Note - Pain Clinic


Date of Service:


DOS:


DATE: 3/14/22 


TIME: 16:35





Diagnosis:


Dx:


Lumbar radiculopathy with lumbar degenerative disc disease and lumbar 

spondylosis





History or Present Illness:


HPI:


56-year-old female returns for follow-up status post lumbar epidural steroid 

injection translaminar transforaminal injections and facet injections also 

neurosurgical evaluation recently and lumbar myelogram with surgical 

recommendations currently.  Patient was preauthorize for spinal cord stimulator 

and would like to proceed with the temporary leads placement today.  Patient 

reports still significant pain in the low back and into the left lower extremity

as it was previously posterior gluteus posterior lateral thigh lateral anterior 

thigh anteromedial thigh medial calf and posterior calf described as sharp and 

dull in the back radiating shooting the leg tingling burning can be cramping 

stabbing can be constant severe at times as well patient reports is a 9 to a 10 

on a scale of 10 at all times average worst and least over the past week and is 

a 9-10 today.  Patient reports is worse with standing walking also sitting or 

laying down, and disturbs her sleep significantly, awaken her from sleep several

times overnight.  Patient reports no loss of motor function but significant 

fatigability of the left lower extremity.





Physical Exam:


VS:


Blood pressure is 146/85 pulse 90 respirations 18 temperature is 98.7 F height 

is 5 inches weight is 236 pounds.


PE:


PHYSICAL EXAMINATION:





GENERAL: The patient is awake, alert, oriented, appropriate, very pleasant in 

demeanor


HEENT: Shows normocephalic, atraumatic.  Extraocular movements are intact and 

symmetrical.  Oral cavity: Mucous membranes moist and pink.  Dentition is 

intact.


NECK: Shows anterior throat supple without palpable lymphadenopathy noted.  

Swallow reflex symmetrical.


CHEST: Shows normal on inspection.  Breath sounds are clear bilaterally.


HEART: Shows S1, S2 clear.  No murmurs auscultated.


ABDOMEN: Soft, nontender, nondistended.  No palpable organomegaly is noted.  


BACK: Shows spine grossly in the midline.  Normal-appearing cervical lordotic 

curvature.  There is moderately increased thoracic kyphosis, some minor 

flattening of the lumbar lordotic curvature.  Lumbar paraspinous muscles show 

symmetrical on inspection, on palpation shows some moderate tenderness diffusely

throughout the upper, middle and lower distribution of the paraspinous muscles 

bilaterally and also into the lower thoracic paraspinous musculature, firm and 

tender, but without specific trigger points, without radiation of pain.  The 

patient has good rotational motion of the lumbar spine, both laterally as well 

as extension and flexion without significant difficulty.  No tenderness over the

spinous processes, sacrum or sacroiliac regions.


EXTREMITIES: Lower extremities show deep tendon reflexes 2+ in the patellar and 

tendo calcaneus tendons.  Motor exam is 5 on a scale of 5 with right 

dorsiflexion, extension, quadriceps and hamstring flexion and 4/5 on the left.  

Peripheral pulses are 1 posterior tibial.  No peripheral edema is noted 

bilaterally.  Lower extremities are warm and dry to touch, equal in color and 

appearance.  


SKIN: Shows warm and dry, good turgor.  No edema.  No sores, rashes or bruising 

throughout.





Procedure:


Procedure:


Options were discussed with the patient.  Patient's old chart was reviewed as 

her current medication regimen updated current review of systems updated today 

as well.  We will proceed with a spinal cord stimulator temporary leads 

placement x2 with fluoroscopic guidance.  Risks were discussed including but not

limited to: Bleeding, infection, possibility of epidural hematoma and subsequent

neurological compromise, dural puncture, headaches, spinal cord and/or nerve 

damage, and poor results regarding pain control.  Patient understands and wished

to proceed.





Medication Injected:


Med Injected:


Under sterile prep and drape patient in prone position using C-arm fluoroscopic 

guidance patient's lumbar spine was identified and vertebral levels were counted

did put external marker on the T8 level.  This time the lumbar spine was 

revisualized and using 1% lidocaine, the area over the L4-5 level was 

anesthetized and then using a 14-gauge Second Funneltead needle with stylette was entered 

to the epidural space at the L2-3 level using a paramedian approach to the right

with preservative-free normal saline loss-of-resistance technique aspiration was

noted to be negative and using direct fluoroscopy visualization spinal cord 

stimulator lead was then advanced without significant resistance in the midline 

and confirmed posterior with both AP and lateral views, and advanced to the 

superior endplate of the T8 vertebral level superimposed with the superior 

spinal cord stimulator electrode lead.  Fluoroscopy was used in a lateral view 

to verify posterior placement in the epidural space at this point as well.  At 

this time a second lead was then introduced in similar fashion at the L4-5 level

and inserted and in the epidural space at the L2-3 level once again with 

preservative-free normal saline loss-of-resistance technique.  Aspiration was 

again noted to be negative and using direct visualization with fluoroscopy 

second lumbar spinal cord stimulator lead was advanced without significant 

resistance in the midline with the superior electrode superimposed over the 

superior endplate of the T9 vertebral body.  Lateral visualization was again 

confirmed with placement of the stimulator in the posterior epidural space.  At 

this time the needles and stylette were removed with intermittent fluoroscopic 

visualization maintaining that the leads had not moved during this process and 

this was confirmed.  This time 1% lidocaine was used to anesthetize the skin 

next to the insertion sites of the stimulator wires and using a 2-0 silk were 

then sutured in place.  Mastisol and Tegaderm was then applied as well as 

reinforcing tape and gauze.  Patient was transferred to the recovery area under 

his own power walking without difficulty and had no immediate complications from

the procedure.  Stimulation was then carried out with [facility] representatives.  

Patient will return to the clinic in approximately 1 week for removal of the 

temporary leads and reassessment of the patient's pain level.





Condition at Discharge:


Condition at Discharge:


Condition at discharge stable, patient Ashok the procedure well and had no 

complications.











LISANDRO SCOTT MD               Mar 14, 2022 16:42

## 2022-03-18 ENCOUNTER — HOSPITAL ENCOUNTER (OUTPATIENT)
Dept: HOSPITAL 61 - ONCLAB | Age: 57
End: 2022-03-18
Attending: INTERNAL MEDICINE
Payer: COMMERCIAL

## 2022-03-18 DIAGNOSIS — E03.2: ICD-10-CM

## 2022-03-18 DIAGNOSIS — R73.02: ICD-10-CM

## 2022-03-18 DIAGNOSIS — C50.112: Primary | ICD-10-CM

## 2022-03-18 LAB
ALBUMIN SERPL-MCNC: 3.7 G/DL (ref 3.4–5)
ALBUMIN/GLOB SERPL: 0.9 {RATIO} (ref 1–1.7)
ALP SERPL-CCNC: 99 U/L (ref 46–116)
ALT SERPL-CCNC: 25 U/L (ref 14–59)
ANION GAP SERPL CALC-SCNC: 6 MMOL/L (ref 6–14)
AST SERPL-CCNC: 14 U/L (ref 15–37)
BASOPHILS # BLD AUTO: 0.1 X10^3/UL (ref 0–0.2)
BASOPHILS NFR BLD: 1 % (ref 0–3)
BILIRUB SERPL-MCNC: 0.2 MG/DL (ref 0.2–1)
BUN SERPL-MCNC: 18 MG/DL (ref 7–20)
BUN/CREAT SERPL: 30 (ref 6–20)
CALCIUM SERPL-MCNC: 9.3 MG/DL (ref 8.5–10.1)
CHLORIDE SERPL-SCNC: 104 MMOL/L (ref 98–107)
CO2 SERPL-SCNC: 29 MMOL/L (ref 21–32)
CREAT SERPL-MCNC: 0.6 MG/DL (ref 0.6–1)
EOSINOPHIL NFR BLD: 0.2 X10^3/UL (ref 0–0.7)
EOSINOPHIL NFR BLD: 3 % (ref 0–3)
ERYTHROCYTE [DISTWIDTH] IN BLOOD BY AUTOMATED COUNT: 14.2 % (ref 11.5–14.5)
GFR SERPLBLD BASED ON 1.73 SQ M-ARVRAT: 102.7 ML/MIN
GLUCOSE SERPL-MCNC: 79 MG/DL (ref 70–99)
HCT VFR BLD CALC: 41.6 % (ref 36–47)
HGB BLD-MCNC: 13.5 G/DL (ref 12–15.5)
LDH SERPL L TO P-CCNC: 212 U/L (ref 81–234)
LYMPHOCYTES # BLD: 1.3 X10^3/UL (ref 1–4.8)
LYMPHOCYTES NFR BLD AUTO: 26 % (ref 24–48)
MCH RBC QN AUTO: 28 PG (ref 25–35)
MCHC RBC AUTO-ENTMCNC: 32 G/DL (ref 31–37)
MCV RBC AUTO: 88 FL (ref 79–100)
MONO #: 0.5 X10^3/UL (ref 0–1.1)
MONOCYTES NFR BLD: 9 % (ref 0–9)
NEUT #: 3.1 X10^3/UL (ref 1.8–7.7)
NEUTROPHILS NFR BLD AUTO: 61 % (ref 31–73)
PLATELET # BLD AUTO: 275 X10^3/UL (ref 140–400)
POTASSIUM SERPL-SCNC: 3.7 MMOL/L (ref 3.5–5.1)
PROT SERPL-MCNC: 7.6 G/DL (ref 6.4–8.2)
RBC # BLD AUTO: 4.74 X10^6/UL (ref 3.5–5.4)
SODIUM SERPL-SCNC: 139 MMOL/L (ref 136–145)
T4 FREE SERPL-MCNC: 1.13 NG/DL (ref 0.76–1.46)
THYROID STIM HORMONE (TSH): 1.41 UIU/ML (ref 0.36–3.74)
WBC # BLD AUTO: 5.1 X10^3/UL (ref 4–11)

## 2022-03-18 PROCEDURE — 84443 ASSAY THYROID STIM HORMONE: CPT

## 2022-03-18 PROCEDURE — 84439 ASSAY OF FREE THYROXINE: CPT

## 2022-03-18 PROCEDURE — 83036 HEMOGLOBIN GLYCOSYLATED A1C: CPT

## 2022-03-18 PROCEDURE — 80053 COMPREHEN METABOLIC PANEL: CPT

## 2022-03-18 PROCEDURE — 36415 COLL VENOUS BLD VENIPUNCTURE: CPT

## 2022-03-18 PROCEDURE — 83615 LACTATE (LD) (LDH) ENZYME: CPT

## 2022-03-18 PROCEDURE — 85025 COMPLETE CBC W/AUTO DIFF WBC: CPT

## 2022-03-19 LAB — HBA1C MFR BLD: 5.7 % (ref 4.8–5.6)

## 2022-03-21 ENCOUNTER — HOSPITAL ENCOUNTER (OUTPATIENT)
Dept: HOSPITAL 61 - PNCL | Age: 57
Discharge: HOME | End: 2022-03-21
Attending: ANESTHESIOLOGY
Payer: COMMERCIAL

## 2022-03-21 DIAGNOSIS — M47.26: ICD-10-CM

## 2022-03-21 DIAGNOSIS — Z72.89: ICD-10-CM

## 2022-03-21 DIAGNOSIS — F41.9: ICD-10-CM

## 2022-03-21 DIAGNOSIS — Z88.8: ICD-10-CM

## 2022-03-21 DIAGNOSIS — M51.16: Primary | ICD-10-CM

## 2022-03-21 DIAGNOSIS — M19.90: ICD-10-CM

## 2022-03-21 DIAGNOSIS — G47.30: ICD-10-CM

## 2022-03-21 DIAGNOSIS — Z98.890: ICD-10-CM

## 2022-03-21 DIAGNOSIS — E03.9: ICD-10-CM

## 2022-03-21 DIAGNOSIS — Z90.710: ICD-10-CM

## 2022-03-21 DIAGNOSIS — E11.9: ICD-10-CM

## 2022-03-21 PROCEDURE — G0463 HOSPITAL OUTPT CLINIC VISIT: HCPCS

## 2022-03-21 PROCEDURE — 99212 OFFICE O/P EST SF 10 MIN: CPT

## 2022-03-21 NOTE — PDOC
Progress Note - Pain Clinic


Date of Service:


DOS:


DATE: 3/21/22 


TIME: 11:11





Diagnosis:


Dx:


Lumbar radiculopathy with lumbar degenerative disease and lumbar spondylosis





History or Present Illness:


HPI:


56-year-old female returns for follow-up status post spinal cord stimulator 

temporary leads placement 1 week ago.  Patient reports doing very well near 100%

improvement with the pain in her low back and left lower extremity she is very 

pleased with the improvement that she received patient reports an increase in 

activity with greater ease and comfort walking greater distances doing household

activities working greater durations and try with greater ease and comfort 

sleeping better at night patient reports does not awaken her from sleep 

currently she is very pleased with her progress and is quite excited that the 

pain is reduced to the significant extent that it is reduced patient reports is 

a 1 to a 2 on a scale of 10 is worst least and average is a 1 today.  Patient 

reports is tingling and aching in the leg at times but significantly reduced to 

near 100% as noted.  Patient reports no bowel or bladder incontinence or other 

deficits.





Physical Exam:


VS:


Blood pressure is 157/93 pulse 83 respirations 18 temperature 99.0 F height 5 

feet 9 inches weight 236 pounds.


PE:


PHYSICAL EXAMINATION:





GENERAL: The patient is awake, alert, oriented, appropriate, very pleasant in 

demeanor


HEENT: Shows normocephalic, atraumatic.  Extraocular movements are intact and 

symmetrical.  


NECK: Shows anterior throat supple without palpable lymphadenopathy noted.  

Swallow reflex symmetrical.


CHEST: Shows normal on inspection.  Breath sounds are clear bilaterally.


HEART: Shows S1, S2 clear.  No murmurs auscultated.


ABDOMEN: Soft, nontender, nondistended.  No palpable organomegaly is noted. 


BACK: Shows spine grossly in the midline.  Normal-appearing cervical lordotic 

curvature.  There is slightly increased thoracic kyphosis, some flattening of 

the lumbar lordotic curvature.  Tegaderms are intact with some small amount of 

dried blood under the dressing.  No erythema no exudate.  Lumbar paraspinous 

muscles show symmetrical on inspection, on palpation shows some moderate 

tenderness diffusely throughout the upper, middle and lower distribution of the 

paraspinous muscles without specific trigger points, without radiation of pain. 

The patient has good rotational motion of the lumbar spine, both laterally as 

well as extension and flexion without significant difficulty.  No tenderness 

over the spinous processes, sacrum or sacroiliac regions.


EXTREMITIES: Lower extremities show deep tendon reflexes 2+ in the patellar and 

tendo calcaneus tendons.  Motor exam is 5 on a scale of 5 with right 

dorsiflexion, extension, quadriceps and hamstring flexion and 4/5 on the left.  

Peripheral pulses are 1+ posterior tibial.  No peripheral edema is noted 

bilaterally.  Lower extremities are warm and dry to touch, equal in color and 

appearance.  


SKIN: Shows warm and dry, good turgor.  No edema.  No sores, rashes or bruising 

throughout.





Procedure:


Procedure:


Options were discussed with the patient.  Patient's chart was reviewed her 

current medication regimen updated current review of systems updated today as 

well.  We took down patient's dressing under sterile prep and drape and sutures 

were cut sterilely leads were removed x2 with tips intact, sites clean and dry 

no erythema no drainage no significant tenderness.


We will make arrangements for permanent spinal cord stimulator system 

placements.





Medication Injected:


Med Injected:


None





Condition at Discharge:


Condition at Discharge:


Condition at discharge is stable.











LISANDRO SCOTT MD               Mar 21, 2022 11:14

## 2022-04-08 ENCOUNTER — HOSPITAL ENCOUNTER (OUTPATIENT)
Dept: HOSPITAL 61 - SURG | Age: 57
Discharge: HOME | End: 2022-04-08
Attending: ANESTHESIOLOGY
Payer: COMMERCIAL

## 2022-04-08 VITALS
SYSTOLIC BLOOD PRESSURE: 137 MMHG | DIASTOLIC BLOOD PRESSURE: 88 MMHG | SYSTOLIC BLOOD PRESSURE: 137 MMHG | SYSTOLIC BLOOD PRESSURE: 137 MMHG | DIASTOLIC BLOOD PRESSURE: 88 MMHG | SYSTOLIC BLOOD PRESSURE: 137 MMHG | DIASTOLIC BLOOD PRESSURE: 88 MMHG | DIASTOLIC BLOOD PRESSURE: 88 MMHG

## 2022-04-08 VITALS — WEIGHT: 233.69 LBS | HEIGHT: 69 IN | BODY MASS INDEX: 34.61 KG/M2

## 2022-04-08 VITALS — DIASTOLIC BLOOD PRESSURE: 89 MMHG | SYSTOLIC BLOOD PRESSURE: 139 MMHG

## 2022-04-08 DIAGNOSIS — Z88.8: ICD-10-CM

## 2022-04-08 DIAGNOSIS — Z91.040: ICD-10-CM

## 2022-04-08 DIAGNOSIS — Z98.890: ICD-10-CM

## 2022-04-08 DIAGNOSIS — M19.90: ICD-10-CM

## 2022-04-08 DIAGNOSIS — G47.30: ICD-10-CM

## 2022-04-08 DIAGNOSIS — Z85.3: ICD-10-CM

## 2022-04-08 DIAGNOSIS — Z72.89: ICD-10-CM

## 2022-04-08 DIAGNOSIS — E11.9: ICD-10-CM

## 2022-04-08 DIAGNOSIS — F41.9: ICD-10-CM

## 2022-04-08 DIAGNOSIS — E03.9: ICD-10-CM

## 2022-04-08 DIAGNOSIS — M48.061: ICD-10-CM

## 2022-04-08 DIAGNOSIS — Z79.899: ICD-10-CM

## 2022-04-08 DIAGNOSIS — Z90.710: ICD-10-CM

## 2022-04-08 DIAGNOSIS — M51.16: Primary | ICD-10-CM

## 2022-04-08 PROCEDURE — 76000 FLUOROSCOPY <1 HR PHYS/QHP: CPT

## 2022-04-08 PROCEDURE — C1822 GEN, NEURO, HF, RECHG BAT: HCPCS

## 2022-04-08 PROCEDURE — 63685 INS/RPLC SPI NPG/RCVR POCKET: CPT

## 2022-04-08 PROCEDURE — A4657 SYRINGE W/WO NEEDLE: HCPCS

## 2022-04-08 PROCEDURE — A6258 TRANSPARENT FILM >16<=48 IN: HCPCS

## 2022-04-08 PROCEDURE — A4452 WATERPROOF TAPE: HCPCS

## 2022-04-08 PROCEDURE — A4322 IRRIGATION SYRINGE: HCPCS

## 2022-04-08 PROCEDURE — C1787 PATIENT PROGR, NEUROSTIM: HCPCS

## 2022-04-08 PROCEDURE — A4364 ADHESIVE, LIQUID OR EQUAL: HCPCS

## 2022-04-08 PROCEDURE — A4930 STERILE, GLOVES PER PAIR: HCPCS

## 2022-04-08 PROCEDURE — A6254 ABSORPT DRG <=16 SQ IN W/BDR: HCPCS

## 2022-04-08 PROCEDURE — C1778 LEAD, NEUROSTIMULATOR: HCPCS

## 2022-04-08 RX ADMIN — FENTANYL CITRATE PRN MCG: 50 INJECTION INTRAMUSCULAR; INTRAVENOUS at 16:39

## 2022-04-08 RX ADMIN — FENTANYL CITRATE PRN MCG: 50 INJECTION INTRAMUSCULAR; INTRAVENOUS at 16:22

## 2022-04-08 NOTE — DISCH
DISCHARGE INSTRUCTIONS


Condition on Discharge


Condition on Discharge:  Stable





Activity After Discharge


Activity Instructions for Disc:  Activity as tolerated


Bathing Instructions:  Shower-keep dressing dry


Driving Instructions after Dis:  Do not drive today





Diet after Discharge


Diet after Discharge:  Regular





Wound Incision Care


Wound/Incision Care:  Other, see below





Contacting the DR. after DC


Call your doctor for:  Concerns you may have











LISANDRO SCOTT MD                Apr 8, 2022 16:16

## 2022-04-08 NOTE — PDOC4
OPERATIVE NOTE


Date:


Date:  Apr 8, 2022





Pre-Op Diagnosis:


Lumbar radiculopathy with lumbar degenerative disc disease and lumbar spinal 

stenosis





Post-Op Diagnosis:


Same





Procedure Performed:


Spinal cord stimulator dual lead implantation with generator implantation





Surgeon:


Jermaine





Anesthesia Type:


general endotracheal





Blood Loss:


100cc





Specimans Obtained:


none





Findings:


See dictation





Complications:


None





Operative Note:


Patient was consented for spinal cord stimulator dual lead placement as well as 

generator placement.  Risk were discussed including but not limited to bleeding 

infection possibility of migration of the leads, dural punctures and spinal 

headache, pain at the insertion sites and generator site, poor results regarding

pain control.  Patient understands wished to proceed.  Patient was taken to 

operating [facility]. number 8, general endotracheal anesthesia was induced and patient

was placed in a prone position with all pressure points padded and breath sounds

clear bilaterally.  Patient was prepped in the usual fashion using sterile prep 

and sterile drape prep was allowed to dry for 3 minutes before draping.  Using 

C-arm fluoroscopic guidance patient's lumbar spine was identified and vertebral 

levels were counted did put external marker on the T8 level.  This time the 

lumbar spine was revisualized and using 1% lidocaine, the area over the L3 4 

level was anesthetized and then using a 14-gauge LEAPIN Digital Keystead needle with stylette 

was entered to the epidural space at the L1 -2 level using a paramedian approach

to the right with preservative-free normal saline loss-of-resistance technique 

aspiration was noted to be negative and using direct fluoroscopy visualization 

spinal cord stimulator lead was then advanced without significant resistance in 

the midline and confirmed posterior with both AP and lateral views, and advanced

to the superior endplate of the T8 vertebral level superimposed with the 

superior spinal cord stimulator electrode lead.  Fluoroscopy was used in a 

lateral view to verify posterior placement in the epidural space at this point 

as well.  At this time a second lead was then introduced in similar fashion at 

the L 3-4 level and inserted and in the epidural space at the L1-2 level once 

again with preservative-free normal saline loss-of-resistance technique.  

Aspiration was again noted to be negative and using direct visualization with 

fluoroscopy second lumbar spinal cord stimulator lead was advanced without 

significant resistance in the midline with the superior electrode superimposed 

over the superior endplate of the T9 vertebral body.  Lateral visualization was 

again confirmed with placement of the stimulator in the posterior epidural 

space.  At this time additional 1% lidocaine with 1: 200,000 epinephrine was 

used to infiltrate area superior to the insertion sites of the superior needle 

and the inferior needle.  Using a 15 blade scalpel the skin was then incised 

through the anesthetized area and a sagittal direction this was extended into 

the subcutaneous tissues and cut down to the needles and approximately 4 cm 

above and 2 cm below.  Local hemorrhage was controlled with pressure and 

electrocautery.  At this time a 0 silk suture was placed around the needle and 

incorporating the periosteum of the left aspect of the vertebral periosteum for 

each needle.  Pop-off needles were removed and under direct fluoroscopic 

visualization the epidural needles were then removed and stylette removed from 

the spinal cord stimulator leads with verification of no movement of the spinal 

stimulator leads at their most superior aspect of the T8 and T9 levels.  The 

previously placed 0 silk sutures were then used to wrap the spinal cord 

stimulator leads x3 above and 3 below and then suture was tied for each lead.  

Again visualization was used to confirm no movement of the leads during securing

the wires with the suture.  At this time the wound was irrigated x3 with 

bacitracin irrigation and reinspected with no local hemorrhage noted.  Next, 

area to the left of the needle insertion site was identified for a pocket 

placement for the spinal cord stimulator generator.  Using 1% lidocaine with 1: 

200,000 epinephrine again in a sagittal direction was infiltrated approximately 

8 cm from the original incision to the left aspect of th the low back.  Using 15

blade scalpel was incised through the anesthetized area incision was carried 

down through the fascia and into the adipose tissue and a pocket was made to the

lateral aspect of the incision using both sharp and blunt dissection with local 

hemorrhage controlled with pressure and electrocautery.  Pocket was checked with

stimulator generator sizer included in the kit and was found to accommodate the 

sizer adequately.  This time the pocket was then irrigated with bacitracin 

irrigation x3 and reinspected with no local hemorrhage identified.  At this time

tunneling device was used to connect to the incision site for the stimulator 

leads to the newly created pocket.  Wires were transferred through the tunneling

device and tunneling device was removed with the wires now in the pockets.  At 

this time the spinal cord stimulator generator itself was attached to the 

medical stimulator leads and impedances were checked showing good impedance on 

all leads.  The leads were then locked into place with screwdriver lock 

mechanism.  Stimulator generator was then placed in the pocket with the leads 

coiled away from the skin behind the generator.  Wounds were then closed using 

interrupted 2-0 Vicryl and skin closed on each wound with running suture of 3-0 

Vicryl.  Wounds were then dressed with Steri-Strips and Tegaderm dressings.  

Patient tolerated the procedure well had no immediate complications was 

transferred to the recovery room in stable and awake condition.











LISANDRO SCOTT MD                Apr 8, 2022 16:42
Pneumonia due to infectious organism, unspecified laterality, unspecified part of lung

## 2022-04-12 ENCOUNTER — HOSPITAL ENCOUNTER (OUTPATIENT)
Dept: HOSPITAL 61 - PNCL | Age: 57
Discharge: HOME | End: 2022-04-12
Attending: ANESTHESIOLOGY
Payer: COMMERCIAL

## 2022-04-12 DIAGNOSIS — M47.26: ICD-10-CM

## 2022-04-12 DIAGNOSIS — Z85.3: ICD-10-CM

## 2022-04-12 DIAGNOSIS — Z98.890: ICD-10-CM

## 2022-04-12 DIAGNOSIS — E11.9: ICD-10-CM

## 2022-04-12 DIAGNOSIS — F41.9: ICD-10-CM

## 2022-04-12 DIAGNOSIS — M19.90: ICD-10-CM

## 2022-04-12 DIAGNOSIS — Z88.8: ICD-10-CM

## 2022-04-12 DIAGNOSIS — G47.30: ICD-10-CM

## 2022-04-12 DIAGNOSIS — Z79.899: ICD-10-CM

## 2022-04-12 DIAGNOSIS — M51.16: Primary | ICD-10-CM

## 2022-04-12 DIAGNOSIS — E03.9: ICD-10-CM

## 2022-04-12 DIAGNOSIS — Z91.040: ICD-10-CM

## 2022-04-12 PROCEDURE — 99212 OFFICE O/P EST SF 10 MIN: CPT

## 2022-04-12 PROCEDURE — G0463 HOSPITAL OUTPT CLINIC VISIT: HCPCS
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
## 2022-04-15 ENCOUNTER — HOSPITAL ENCOUNTER (OUTPATIENT)
Dept: HOSPITAL 61 - PNCL | Age: 57
Discharge: HOME | End: 2022-04-15
Attending: ANESTHESIOLOGY
Payer: COMMERCIAL

## 2022-04-15 DIAGNOSIS — M47.26: Primary | ICD-10-CM

## 2022-04-15 DIAGNOSIS — M19.90: ICD-10-CM

## 2022-04-15 DIAGNOSIS — Z88.8: ICD-10-CM

## 2022-04-15 DIAGNOSIS — G47.30: ICD-10-CM

## 2022-04-15 DIAGNOSIS — M51.16: ICD-10-CM

## 2022-04-15 DIAGNOSIS — E03.9: ICD-10-CM

## 2022-04-15 DIAGNOSIS — F41.9: ICD-10-CM

## 2022-04-15 DIAGNOSIS — Z98.890: ICD-10-CM

## 2022-04-15 DIAGNOSIS — Z79.899: ICD-10-CM

## 2022-04-15 DIAGNOSIS — Z85.3: ICD-10-CM

## 2022-04-15 DIAGNOSIS — Z90.710: ICD-10-CM

## 2022-04-15 DIAGNOSIS — E11.9: ICD-10-CM

## 2022-04-15 PROCEDURE — G0463 HOSPITAL OUTPT CLINIC VISIT: HCPCS

## 2022-04-15 NOTE — PDOC
Progress Note - Pain Clinic


Date of Service:


DOS:


DATE: 4/15/22 


TIME: 10:21





Diagnosis:


Dx:


Lumbar radiculopathy with lumbar degenerative disease and lumbar spondylosis





History or Present Illness:


HPI:


56-year-old female returns for follow-up status post spinal cord stimulator 

generator and dual lead system implantation 1 week ago.  Patient was having some

difficulty with her bandage earlier in the week and we redressed this and I 

reassessed and she is returned today on the scheduled follow-up reporting 

excellent coverage with the stimulation at 99% improvement in the pain in the 

low back and legs but still has some pain in the insertion site and operative 

site as well.  Patient reports it feels like a ripping and freezing sensation 

when she is moving around in the low back static pain hurts getting up and down 

from laying down to standing to sitting and vice versa and very tender at the 

incision site.  Patient reports otherwise doing very well to stimulation reports

no fevers no further drainage of the wound as well.





Physical Exam:


VS:


Blood pressure is 148/77 pulse is 81 respirations 18 temperature 99.0 F weight 

is 233 pounds.


PE:


PHYSICAL EXAMINATION:





GENERAL: The patient is awake, alert, oriented, appropriate, very pleasant in 

demeanor


HEENT: Shows normocephalic, atraumatic.  Extraocular movements are intact and 

symmetrical.  Oral cavity: Mucous membranes moist and pink.  Dentition is 

intact.


NECK: Shows anterior throat supple without palpable lymphadenopathy noted.  

Swallow reflex symmetrical.


CHEST: Shows normal on inspection.  Breath sounds are clear bilaterally.


HEART: Shows S1, S2 clear.  No murmurs auscultated.


ABDOMEN: Soft, nontender, nondistended.  No palpable organomegaly is noted.  


BACK: Shows spine grossly in the midline.  Normal-appearing cervical lordotic 

curvature.  There is slightly increased thoracic kyphosis, some minor flattening

of the lumbar lordotic curvature.  Surgical site shows some bruising but intact 

with Steri-Strips and Tegaderm Tegaderm was taken down today under sterile prep 

and drape and wound assessed with good adhesion of the Steri-Strips.  Tegaderm 

was replaced sterilely over the Tegaderms without gauze on redress of the wound 

today.  Stimulator easily palpable somewhat tender at both incision sites and 

lateral to the stimulator as well with some bruising noted also.


EXTREMITIES: Lower extremities show deep tendon reflexes 2 in the patellar and 

tendo calcaneus tendons.  Motor exam is 5 on a scale of 5 with right 

dorsiflexion, extension, quadriceps and hamstring flexion and 4/5 on the left.  

Peripheral pulses are 1+ posterior tibial.  No peripheral edema is noted 

bilaterally.  Lower extremities are warm and dry to touch, equal in color and 

appearance.  


SKIN: Shows warm and dry, good turgor.  No edema.  No sores, rashes or bruising 

throughout.





Procedure:


Procedure:


Options discussed with the patient.  Patient's old chart was reviewed as her 

current medication regimen updated current review of systems updated today as 

well.  We will redress patient's wound as noted under sterile technique patient 

will follow up in approximately 1 week or as necessary sooner.





Medication Injected:


Med Injected:


None





Condition at Discharge:


Condition at Discharge:


Condition at discharge is stable.











LISANDRO SCOTT MD               Apr 15, 2022 10:24

## 2022-05-16 ENCOUNTER — HOSPITAL ENCOUNTER (OUTPATIENT)
Dept: HOSPITAL 61 - LAB | Age: 57
End: 2022-05-16
Attending: FAMILY MEDICINE
Payer: COMMERCIAL

## 2022-05-16 DIAGNOSIS — E11.9: ICD-10-CM

## 2022-05-16 DIAGNOSIS — C50.912: Primary | ICD-10-CM

## 2022-05-16 DIAGNOSIS — E03.9: ICD-10-CM

## 2022-05-16 DIAGNOSIS — E55.9: ICD-10-CM

## 2022-05-16 LAB
ALBUMIN SERPL-MCNC: 3.8 G/DL (ref 3.4–5)
ALBUMIN/GLOB SERPL: 1 {RATIO} (ref 1–1.7)
ALP SERPL-CCNC: 106 U/L (ref 46–116)
ALT SERPL-CCNC: 32 U/L (ref 14–59)
ANION GAP SERPL CALC-SCNC: 9 MMOL/L (ref 6–14)
AST SERPL-CCNC: 21 U/L (ref 15–37)
BASOPHILS # BLD AUTO: 0 X10^3/UL (ref 0–0.2)
BASOPHILS NFR BLD: 1 % (ref 0–3)
BILIRUB SERPL-MCNC: 0.3 MG/DL (ref 0.2–1)
BUN SERPL-MCNC: 14 MG/DL (ref 7–20)
BUN/CREAT SERPL: 20 (ref 6–20)
CALCIUM SERPL-MCNC: 9.3 MG/DL (ref 8.5–10.1)
CHLORIDE SERPL-SCNC: 103 MMOL/L (ref 98–107)
CHOLEST SERPL-MCNC: 184 MG/DL (ref 0–200)
CHOLEST/HDLC SERPL: 3.1 {RATIO}
CO2 SERPL-SCNC: 30 MMOL/L (ref 21–32)
CREAT SERPL-MCNC: 0.7 MG/DL (ref 0.6–1)
EOSINOPHIL NFR BLD: 0.1 X10^3/UL (ref 0–0.7)
EOSINOPHIL NFR BLD: 2 % (ref 0–3)
ERYTHROCYTE [DISTWIDTH] IN BLOOD BY AUTOMATED COUNT: 14.7 % (ref 11.5–14.5)
GFR SERPLBLD BASED ON 1.73 SQ M-ARVRAT: 85.9 ML/MIN
GLUCOSE SERPL-MCNC: 97 MG/DL (ref 70–99)
HCT VFR BLD CALC: 40.5 % (ref 36–47)
HDLC SERPL-MCNC: 59 MG/DL (ref 40–60)
HGB BLD-MCNC: 13.6 G/DL (ref 12–15.5)
LDLC: 101 MG/DL (ref 0–100)
LYMPHOCYTES # BLD: 1 X10^3/UL (ref 1–4.8)
LYMPHOCYTES NFR BLD AUTO: 22 % (ref 24–48)
MCH RBC QN AUTO: 29 PG (ref 25–35)
MCHC RBC AUTO-ENTMCNC: 34 G/DL (ref 31–37)
MCV RBC AUTO: 86 FL (ref 79–100)
MONO #: 0.3 X10^3/UL (ref 0–1.1)
MONOCYTES NFR BLD: 6 % (ref 0–9)
NEUT #: 3.3 X10^3/UL (ref 1.8–7.7)
NEUTROPHILS NFR BLD AUTO: 70 % (ref 31–73)
PLATELET # BLD AUTO: 248 X10^3/UL (ref 140–400)
POTASSIUM SERPL-SCNC: 3.9 MMOL/L (ref 3.5–5.1)
PROT SERPL-MCNC: 7.6 G/DL (ref 6.4–8.2)
RBC # BLD AUTO: 4.69 X10^6/UL (ref 3.5–5.4)
SODIUM SERPL-SCNC: 142 MMOL/L (ref 136–145)
T4 FREE SERPL-MCNC: 1.21 NG/DL (ref 0.76–1.46)
THYROID STIM HORMONE (TSH): 1.23 UIU/ML (ref 0.36–3.74)
TRIGL SERPL-MCNC: 120 MG/DL (ref 0–150)
VLDLC: 24 MG/DL (ref 0–40)
WBC # BLD AUTO: 4.7 X10^3/UL (ref 4–11)

## 2022-05-16 PROCEDURE — 80053 COMPREHEN METABOLIC PANEL: CPT

## 2022-05-16 PROCEDURE — 84443 ASSAY THYROID STIM HORMONE: CPT

## 2022-05-16 PROCEDURE — 84439 ASSAY OF FREE THYROXINE: CPT

## 2022-05-16 PROCEDURE — 80061 LIPID PANEL: CPT

## 2022-05-16 PROCEDURE — 85025 COMPLETE CBC W/AUTO DIFF WBC: CPT

## 2022-05-16 PROCEDURE — 82306 VITAMIN D 25 HYDROXY: CPT
